# Patient Record
Sex: MALE | Race: WHITE | Employment: UNEMPLOYED | URBAN - METROPOLITAN AREA
[De-identification: names, ages, dates, MRNs, and addresses within clinical notes are randomized per-mention and may not be internally consistent; named-entity substitution may affect disease eponyms.]

---

## 2017-08-28 ENCOUNTER — HOSPITAL ENCOUNTER (EMERGENCY)
Age: 37
Discharge: HOME | End: 2017-08-29
Attending: EMERGENCY MEDICINE
Payer: COMMERCIAL

## 2017-08-28 DIAGNOSIS — S00.33XA NASAL CONTUSION: Primary | ICD-10-CM

## 2017-08-28 PROCEDURE — 99284 EMERGENCY DEPT VISIT MOD MDM: CPT

## 2017-08-28 RX ORDER — TRAZODONE HYDROCHLORIDE 100 MG/1
100 TABLET ORAL NIGHTLY
Status: ON HOLD | COMMUNITY
End: 2021-08-26

## 2017-08-28 RX ORDER — GABAPENTIN 100 MG/1
100 CAPSULE ORAL 3 TIMES DAILY
Status: ON HOLD | COMMUNITY
End: 2021-08-26

## 2017-08-28 RX ORDER — BUPROPION HYDROCHLORIDE 100 MG/1
100 TABLET ORAL 2 TIMES DAILY
Status: ON HOLD | COMMUNITY
End: 2021-08-26

## 2017-08-28 RX ORDER — DIVALPROEX SODIUM 125 MG/1
125 CAPSULE, COATED PELLETS ORAL 2 TIMES DAILY
Status: ON HOLD | COMMUNITY
End: 2021-08-26

## 2017-08-28 ASSESSMENT — PAIN DESCRIPTION - LOCATION: LOCATION: NOSE

## 2017-08-28 ASSESSMENT — PAIN DESCRIPTION - DESCRIPTORS: DESCRIPTORS: ACHING

## 2017-08-28 ASSESSMENT — PAIN DESCRIPTION - PAIN TYPE: TYPE: ACUTE PAIN

## 2017-08-28 ASSESSMENT — PAIN DESCRIPTION - FREQUENCY: FREQUENCY: CONTINUOUS

## 2017-08-28 ASSESSMENT — PAIN SCALES - GENERAL: PAINLEVEL_OUTOF10: 7

## 2017-08-29 ENCOUNTER — APPOINTMENT (OUTPATIENT)
Dept: CT IMAGING | Age: 37
End: 2017-08-29
Payer: COMMERCIAL

## 2017-08-29 VITALS
SYSTOLIC BLOOD PRESSURE: 128 MMHG | TEMPERATURE: 99.6 F | HEIGHT: 68 IN | DIASTOLIC BLOOD PRESSURE: 74 MMHG | HEART RATE: 75 BPM | OXYGEN SATURATION: 98 % | WEIGHT: 220 LBS | BODY MASS INDEX: 33.34 KG/M2 | RESPIRATION RATE: 14 BRPM

## 2017-08-29 PROCEDURE — 6370000000 HC RX 637 (ALT 250 FOR IP): Performed by: EMERGENCY MEDICINE

## 2017-08-29 PROCEDURE — 70486 CT MAXILLOFACIAL W/O DYE: CPT

## 2017-08-29 RX ORDER — TRAMADOL HYDROCHLORIDE 50 MG/1
50 TABLET ORAL ONCE
Status: COMPLETED | OUTPATIENT
Start: 2017-08-29 | End: 2017-08-29

## 2017-08-29 RX ORDER — IBUPROFEN 600 MG/1
600 TABLET ORAL EVERY 6 HOURS PRN
Qty: 30 TABLET | Refills: 0 | Status: ON HOLD | OUTPATIENT
Start: 2017-08-29 | End: 2021-08-28 | Stop reason: HOSPADM

## 2017-08-29 RX ADMIN — TRAMADOL HYDROCHLORIDE 50 MG: 50 TABLET, FILM COATED ORAL at 00:18

## 2017-08-29 ASSESSMENT — PAIN DESCRIPTION - PROGRESSION: CLINICAL_PROGRESSION: GRADUALLY IMPROVING

## 2017-08-29 ASSESSMENT — PAIN DESCRIPTION - PAIN TYPE: TYPE: ACUTE PAIN

## 2017-08-29 ASSESSMENT — ENCOUNTER SYMPTOMS
SORE THROAT: 0
CHEST TIGHTNESS: 0
SHORTNESS OF BREATH: 0
EYE DISCHARGE: 0
PHOTOPHOBIA: 0
ABDOMINAL PAIN: 0
ABDOMINAL DISTENTION: 0
VOMITING: 0
COUGH: 0
WHEEZING: 0

## 2017-08-29 ASSESSMENT — PAIN SCALES - GENERAL
PAINLEVEL_OUTOF10: 7
PAINLEVEL_OUTOF10: 4

## 2017-08-29 ASSESSMENT — PAIN DESCRIPTION - DESCRIPTORS: DESCRIPTORS: ACHING

## 2017-08-29 ASSESSMENT — PAIN DESCRIPTION - LOCATION: LOCATION: HEAD

## 2017-08-29 ASSESSMENT — PAIN DESCRIPTION - FREQUENCY: FREQUENCY: CONTINUOUS

## 2021-08-26 ENCOUNTER — APPOINTMENT (OUTPATIENT)
Dept: GENERAL RADIOLOGY | Age: 41
DRG: 381 | End: 2021-08-26
Payer: COMMERCIAL

## 2021-08-26 ENCOUNTER — HOSPITAL ENCOUNTER (INPATIENT)
Age: 41
LOS: 2 days | Discharge: HOME | DRG: 381 | End: 2021-08-28
Attending: STUDENT IN AN ORGANIZED HEALTH CARE EDUCATION/TRAINING PROGRAM | Admitting: FAMILY MEDICINE
Payer: COMMERCIAL

## 2021-08-26 ENCOUNTER — ANESTHESIA (OUTPATIENT)
Dept: OPERATING ROOM | Age: 41
DRG: 381 | End: 2021-08-26
Payer: COMMERCIAL

## 2021-08-26 ENCOUNTER — ANESTHESIA EVENT (OUTPATIENT)
Dept: OPERATING ROOM | Age: 41
DRG: 381 | End: 2021-08-26
Payer: COMMERCIAL

## 2021-08-26 VITALS — OXYGEN SATURATION: 100 % | SYSTOLIC BLOOD PRESSURE: 108 MMHG | DIASTOLIC BLOOD PRESSURE: 49 MMHG

## 2021-08-26 DIAGNOSIS — K92.2 ACUTE UPPER GASTROINTESTINAL BLEEDING: Primary | ICD-10-CM

## 2021-08-26 DIAGNOSIS — D64.9 SYMPTOMATIC ANEMIA: ICD-10-CM

## 2021-08-26 LAB
ABO/RH: NORMAL
ALBUMIN SERPL-MCNC: 2.8 G/DL (ref 3.5–4.6)
ALP BLD-CCNC: 52 U/L (ref 35–104)
ALT SERPL-CCNC: 364 U/L (ref 0–41)
AMPHETAMINE SCREEN, URINE: POSITIVE
ANION GAP SERPL CALCULATED.3IONS-SCNC: 12 MEQ/L (ref 9–15)
ANTIBODY SCREEN: NORMAL
APTT: 25.2 SEC (ref 24.4–36.8)
AST SERPL-CCNC: 240 U/L (ref 0–40)
BARBITURATE SCREEN URINE: ABNORMAL
BASOPHILS ABSOLUTE: 0 K/UL (ref 0–0.2)
BASOPHILS RELATIVE PERCENT: 0.1 %
BENZODIAZEPINE SCREEN, URINE: ABNORMAL
BILIRUB SERPL-MCNC: 0.3 MG/DL (ref 0.2–0.7)
BILIRUBIN URINE: NEGATIVE
BLOOD BANK DISPENSE STATUS: NORMAL
BLOOD BANK DISPENSE STATUS: NORMAL
BLOOD BANK PRODUCT CODE: NORMAL
BLOOD BANK PRODUCT CODE: NORMAL
BLOOD, URINE: NEGATIVE
BPU ID: NORMAL
BPU ID: NORMAL
BUN BLDV-MCNC: 72 MG/DL (ref 6–20)
CALCIUM SERPL-MCNC: 7 MG/DL (ref 8.5–9.9)
CANNABINOID SCREEN URINE: ABNORMAL
CHLORIDE BLD-SCNC: 89 MEQ/L (ref 95–107)
CK MB: 14.3 NG/ML (ref 0–6.7)
CLARITY: CLEAR
CO2: 23 MEQ/L (ref 20–31)
COCAINE METABOLITE SCREEN URINE: ABNORMAL
COLOR: YELLOW
CREAT SERPL-MCNC: 1.33 MG/DL (ref 0.7–1.2)
CREATINE KINASE-MB INDEX: 3.1 % (ref 0–3.5)
DESCRIPTION BLOOD BANK: NORMAL
DESCRIPTION BLOOD BANK: NORMAL
EOSINOPHILS ABSOLUTE: 0 K/UL (ref 0–0.7)
EOSINOPHILS RELATIVE PERCENT: 0 %
ETHANOL PERCENT: NORMAL G/DL
ETHANOL: <10 MG/DL (ref 0–0.08)
GFR AFRICAN AMERICAN: >60
GFR NON-AFRICAN AMERICAN: 59.9
GLOBULIN: 1.8 G/DL (ref 2.3–3.5)
GLUCOSE BLD-MCNC: 117 MG/DL (ref 70–99)
GLUCOSE BLD-MCNC: 133 MG/DL (ref 60–115)
GLUCOSE URINE: 100 MG/DL
HCT VFR BLD CALC: 13.9 % (ref 42–52)
HCT VFR BLD CALC: 19.1 % (ref 42–52)
HEMOGLOBIN: 4.9 G/DL (ref 14–18)
HEMOGLOBIN: 6.6 G/DL (ref 14–18)
INR BLD: 1.2
KETONES, URINE: NEGATIVE MG/DL
LACTIC ACID: 2.2 MMOL/L (ref 0.5–2.2)
LEUKOCYTE ESTERASE, URINE: NEGATIVE
LYMPHOCYTES ABSOLUTE: 0.6 K/UL (ref 1–4.8)
LYMPHOCYTES RELATIVE PERCENT: 4.5 %
Lab: ABNORMAL
MAGNESIUM: 2.2 MG/DL (ref 1.7–2.4)
MCH RBC QN AUTO: 30.6 PG (ref 27–31.3)
MCHC RBC AUTO-ENTMCNC: 35 % (ref 33–37)
MCV RBC AUTO: 87.3 FL (ref 80–100)
METHADONE SCREEN, URINE: ABNORMAL
MONOCYTES ABSOLUTE: 0.9 K/UL (ref 0.2–0.8)
MONOCYTES RELATIVE PERCENT: 7.1 %
NEUTROPHILS ABSOLUTE: 11.4 K/UL (ref 1.4–6.5)
NEUTROPHILS RELATIVE PERCENT: 88.3 %
NITRITE, URINE: NEGATIVE
OPIATE SCREEN URINE: ABNORMAL
OXYCODONE URINE: ABNORMAL
PDW BLD-RTO: 13.4 % (ref 11.5–14.5)
PERFORMED ON: ABNORMAL
PH UA: 6 (ref 5–9)
PHENCYCLIDINE SCREEN URINE: ABNORMAL
PLATELET # BLD: 293 K/UL (ref 130–400)
POTASSIUM SERPL-SCNC: 4.8 MEQ/L (ref 3.4–4.9)
PROCALCITONIN: 11.93 NG/ML (ref 0–0.15)
PROPOXYPHENE SCREEN: ABNORMAL
PROTEIN UA: ABNORMAL MG/DL
PROTHROMBIN TIME: 15.3 SEC (ref 12.3–14.9)
RBC # BLD: 1.59 M/UL (ref 4.7–6.1)
SARS-COV-2, NAAT: NOT DETECTED
SODIUM BLD-SCNC: 124 MEQ/L (ref 135–144)
SPECIFIC GRAVITY UA: 1.01 (ref 1–1.03)
TOTAL CK: 462 U/L (ref 0–190)
TOTAL PROTEIN: 4.6 G/DL (ref 6.3–8)
URINE REFLEX TO CULTURE: ABNORMAL
UROBILINOGEN, URINE: 0.2 E.U./DL
WBC # BLD: 12.9 K/UL (ref 4.8–10.8)

## 2021-08-26 PROCEDURE — 7100000000 HC PACU RECOVERY - FIRST 15 MIN: Performed by: SPECIALIST

## 2021-08-26 PROCEDURE — 83735 ASSAY OF MAGNESIUM: CPT

## 2021-08-26 PROCEDURE — 82550 ASSAY OF CK (CPK): CPT

## 2021-08-26 PROCEDURE — 86923 COMPATIBILITY TEST ELECTRIC: CPT

## 2021-08-26 PROCEDURE — 82077 ASSAY SPEC XCP UR&BREATH IA: CPT

## 2021-08-26 PROCEDURE — 82553 CREATINE MB FRACTION: CPT

## 2021-08-26 PROCEDURE — 43255 EGD CONTROL BLEEDING ANY: CPT | Performed by: SPECIALIST

## 2021-08-26 PROCEDURE — 85025 COMPLETE CBC W/AUTO DIFF WBC: CPT

## 2021-08-26 PROCEDURE — 85730 THROMBOPLASTIN TIME PARTIAL: CPT

## 2021-08-26 PROCEDURE — 83036 HEMOGLOBIN GLYCOSYLATED A1C: CPT

## 2021-08-26 PROCEDURE — 86901 BLOOD TYPING SEROLOGIC RH(D): CPT

## 2021-08-26 PROCEDURE — 81003 URINALYSIS AUTO W/O SCOPE: CPT

## 2021-08-26 PROCEDURE — 99285 EMERGENCY DEPT VISIT HI MDM: CPT

## 2021-08-26 PROCEDURE — 36430 TRANSFUSION BLD/BLD COMPNT: CPT

## 2021-08-26 PROCEDURE — 84145 PROCALCITONIN (PCT): CPT

## 2021-08-26 PROCEDURE — 2000000000 HC ICU R&B

## 2021-08-26 PROCEDURE — 2720000010 HC SURG SUPPLY STERILE: Performed by: SPECIALIST

## 2021-08-26 PROCEDURE — 85610 PROTHROMBIN TIME: CPT

## 2021-08-26 PROCEDURE — 6360000002 HC RX W HCPCS: Performed by: FAMILY MEDICINE

## 2021-08-26 PROCEDURE — 7100000001 HC PACU RECOVERY - ADDTL 15 MIN: Performed by: SPECIALIST

## 2021-08-26 PROCEDURE — 36415 COLL VENOUS BLD VENIPUNCTURE: CPT

## 2021-08-26 PROCEDURE — 2580000003 HC RX 258: Performed by: FAMILY MEDICINE

## 2021-08-26 PROCEDURE — 96365 THER/PROPH/DIAG IV INF INIT: CPT

## 2021-08-26 PROCEDURE — 87635 SARS-COV-2 COVID-19 AMP PRB: CPT

## 2021-08-26 PROCEDURE — 3609017100 HC EGD: Performed by: SPECIALIST

## 2021-08-26 PROCEDURE — 2500000003 HC RX 250 WO HCPCS: Performed by: STUDENT IN AN ORGANIZED HEALTH CARE EDUCATION/TRAINING PROGRAM

## 2021-08-26 PROCEDURE — 6360000002 HC RX W HCPCS: Performed by: ANESTHESIOLOGY

## 2021-08-26 PROCEDURE — 86900 BLOOD TYPING SEROLOGIC ABO: CPT

## 2021-08-26 PROCEDURE — 83605 ASSAY OF LACTIC ACID: CPT

## 2021-08-26 PROCEDURE — 2709999900 HC NON-CHARGEABLE SUPPLY: Performed by: SPECIALIST

## 2021-08-26 PROCEDURE — 85014 HEMATOCRIT: CPT

## 2021-08-26 PROCEDURE — 96368 THER/DIAG CONCURRENT INF: CPT

## 2021-08-26 PROCEDURE — 80053 COMPREHEN METABOLIC PANEL: CPT

## 2021-08-26 PROCEDURE — 80307 DRUG TEST PRSMV CHEM ANLYZR: CPT

## 2021-08-26 PROCEDURE — 85018 HEMOGLOBIN: CPT

## 2021-08-26 PROCEDURE — 3700000001 HC ADD 15 MINUTES (ANESTHESIA): Performed by: SPECIALIST

## 2021-08-26 PROCEDURE — 2580000003 HC RX 258: Performed by: STUDENT IN AN ORGANIZED HEALTH CARE EDUCATION/TRAINING PROGRAM

## 2021-08-26 PROCEDURE — 6360000002 HC RX W HCPCS: Performed by: SPECIALIST

## 2021-08-26 PROCEDURE — 2580000003 HC RX 258: Performed by: SPECIALIST

## 2021-08-26 PROCEDURE — 6360000002 HC RX W HCPCS: Performed by: STUDENT IN AN ORGANIZED HEALTH CARE EDUCATION/TRAINING PROGRAM

## 2021-08-26 PROCEDURE — 3700000000 HC ANESTHESIA ATTENDED CARE: Performed by: SPECIALIST

## 2021-08-26 PROCEDURE — 0W3P8ZZ CONTROL BLEEDING IN GASTROINTESTINAL TRACT, VIA NATURAL OR ARTIFICIAL OPENING ENDOSCOPIC: ICD-10-PCS | Performed by: SPECIALIST

## 2021-08-26 PROCEDURE — C9113 INJ PANTOPRAZOLE SODIUM, VIA: HCPCS | Performed by: STUDENT IN AN ORGANIZED HEALTH CARE EDUCATION/TRAINING PROGRAM

## 2021-08-26 PROCEDURE — P9016 RBC LEUKOCYTES REDUCED: HCPCS

## 2021-08-26 PROCEDURE — 71045 X-RAY EXAM CHEST 1 VIEW: CPT

## 2021-08-26 PROCEDURE — 86850 RBC ANTIBODY SCREEN: CPT

## 2021-08-26 PROCEDURE — C9113 INJ PANTOPRAZOLE SODIUM, VIA: HCPCS | Performed by: FAMILY MEDICINE

## 2021-08-26 RX ORDER — ACETAMINOPHEN 325 MG/1
650 TABLET ORAL EVERY 6 HOURS PRN
Status: DISCONTINUED | OUTPATIENT
Start: 2021-08-26 | End: 2021-08-28 | Stop reason: HOSPADM

## 2021-08-26 RX ORDER — HYDROCODONE BITARTRATE AND ACETAMINOPHEN 5; 325 MG/1; MG/1
2 TABLET ORAL PRN
Status: DISCONTINUED | OUTPATIENT
Start: 2021-08-26 | End: 2021-08-26 | Stop reason: HOSPADM

## 2021-08-26 RX ORDER — METOCLOPRAMIDE HYDROCHLORIDE 5 MG/ML
10 INJECTION INTRAMUSCULAR; INTRAVENOUS
Status: DISCONTINUED | OUTPATIENT
Start: 2021-08-26 | End: 2021-08-26 | Stop reason: HOSPADM

## 2021-08-26 RX ORDER — DIPHENHYDRAMINE HYDROCHLORIDE 50 MG/ML
12.5 INJECTION INTRAMUSCULAR; INTRAVENOUS
Status: DISCONTINUED | OUTPATIENT
Start: 2021-08-26 | End: 2021-08-26 | Stop reason: HOSPADM

## 2021-08-26 RX ORDER — PANTOPRAZOLE SODIUM 40 MG/1
40 TABLET, DELAYED RELEASE ORAL
Status: DISCONTINUED | OUTPATIENT
Start: 2021-08-29 | End: 2021-08-28 | Stop reason: HOSPADM

## 2021-08-26 RX ORDER — SODIUM CHLORIDE 9 MG/ML
25 INJECTION, SOLUTION INTRAVENOUS PRN
Status: DISCONTINUED | OUTPATIENT
Start: 2021-08-26 | End: 2021-08-28 | Stop reason: HOSPADM

## 2021-08-26 RX ORDER — SODIUM CHLORIDE 9 MG/ML
INJECTION, SOLUTION INTRAVENOUS CONTINUOUS
Status: DISCONTINUED | OUTPATIENT
Start: 2021-08-26 | End: 2021-08-27

## 2021-08-26 RX ORDER — SODIUM CHLORIDE 0.9 % (FLUSH) 0.9 %
5-40 SYRINGE (ML) INJECTION EVERY 12 HOURS SCHEDULED
Status: DISCONTINUED | OUTPATIENT
Start: 2021-08-26 | End: 2021-08-28 | Stop reason: HOSPADM

## 2021-08-26 RX ORDER — ACETAMINOPHEN 650 MG/1
650 SUPPOSITORY RECTAL EVERY 6 HOURS PRN
Status: DISCONTINUED | OUTPATIENT
Start: 2021-08-26 | End: 2021-08-28 | Stop reason: HOSPADM

## 2021-08-26 RX ORDER — MAGNESIUM HYDROXIDE 1200 MG/15ML
LIQUID ORAL PRN
Status: DISCONTINUED | OUTPATIENT
Start: 2021-08-26 | End: 2021-08-26 | Stop reason: ALTCHOICE

## 2021-08-26 RX ORDER — ONDANSETRON 2 MG/ML
4 INJECTION INTRAMUSCULAR; INTRAVENOUS
Status: DISCONTINUED | OUTPATIENT
Start: 2021-08-26 | End: 2021-08-26 | Stop reason: HOSPADM

## 2021-08-26 RX ORDER — HYDROCODONE BITARTRATE AND ACETAMINOPHEN 5; 325 MG/1; MG/1
1 TABLET ORAL PRN
Status: DISCONTINUED | OUTPATIENT
Start: 2021-08-26 | End: 2021-08-26 | Stop reason: HOSPADM

## 2021-08-26 RX ORDER — ONDANSETRON 4 MG/1
4 TABLET, ORALLY DISINTEGRATING ORAL EVERY 8 HOURS PRN
Status: DISCONTINUED | OUTPATIENT
Start: 2021-08-26 | End: 2021-08-28 | Stop reason: HOSPADM

## 2021-08-26 RX ORDER — ONDANSETRON 2 MG/ML
4 INJECTION INTRAMUSCULAR; INTRAVENOUS EVERY 6 HOURS PRN
Status: DISCONTINUED | OUTPATIENT
Start: 2021-08-26 | End: 2021-08-28 | Stop reason: HOSPADM

## 2021-08-26 RX ORDER — FENTANYL CITRATE 50 UG/ML
50 INJECTION, SOLUTION INTRAMUSCULAR; INTRAVENOUS EVERY 10 MIN PRN
Status: DISCONTINUED | OUTPATIENT
Start: 2021-08-26 | End: 2021-08-26 | Stop reason: HOSPADM

## 2021-08-26 RX ORDER — 0.9 % SODIUM CHLORIDE 0.9 %
1000 INTRAVENOUS SOLUTION INTRAVENOUS ONCE
Status: COMPLETED | OUTPATIENT
Start: 2021-08-26 | End: 2021-08-26

## 2021-08-26 RX ORDER — SODIUM CHLORIDE 0.9 % (FLUSH) 0.9 %
5-40 SYRINGE (ML) INJECTION PRN
Status: DISCONTINUED | OUTPATIENT
Start: 2021-08-26 | End: 2021-08-28 | Stop reason: HOSPADM

## 2021-08-26 RX ORDER — MAGNESIUM HYDROXIDE 1200 MG/15ML
LIQUID ORAL CONTINUOUS PRN
Status: COMPLETED | OUTPATIENT
Start: 2021-08-26 | End: 2021-08-26

## 2021-08-26 RX ORDER — SODIUM CHLORIDE 9 MG/ML
INJECTION, SOLUTION INTRAVENOUS PRN
Status: DISCONTINUED | OUTPATIENT
Start: 2021-08-26 | End: 2021-08-28 | Stop reason: HOSPADM

## 2021-08-26 RX ORDER — MEPERIDINE HYDROCHLORIDE 25 MG/ML
12.5 INJECTION INTRAMUSCULAR; INTRAVENOUS; SUBCUTANEOUS EVERY 5 MIN PRN
Status: DISCONTINUED | OUTPATIENT
Start: 2021-08-26 | End: 2021-08-26 | Stop reason: HOSPADM

## 2021-08-26 RX ORDER — PROPOFOL 10 MG/ML
INJECTION, EMULSION INTRAVENOUS PRN
Status: DISCONTINUED | OUTPATIENT
Start: 2021-08-26 | End: 2021-08-26 | Stop reason: SDUPTHER

## 2021-08-26 RX ORDER — ONDANSETRON 2 MG/ML
4 INJECTION INTRAMUSCULAR; INTRAVENOUS
Status: ACTIVE | OUTPATIENT
Start: 2021-08-26 | End: 2021-08-26

## 2021-08-26 RX ADMIN — PROPOFOL 30 MG: 10 INJECTION, EMULSION INTRAVENOUS at 16:41

## 2021-08-26 RX ADMIN — PHENYLEPHRINE HYDROCHLORIDE 100 MCG: 10 INJECTION INTRAVENOUS at 16:21

## 2021-08-26 RX ADMIN — PROPOFOL 70 MG: 10 INJECTION, EMULSION INTRAVENOUS at 16:30

## 2021-08-26 RX ADMIN — PROPOFOL 30 MG: 10 INJECTION, EMULSION INTRAVENOUS at 16:20

## 2021-08-26 RX ADMIN — SODIUM CHLORIDE: 9 INJECTION, SOLUTION INTRAVENOUS at 19:34

## 2021-08-26 RX ADMIN — SODIUM CHLORIDE 1000 ML: 9 INJECTION, SOLUTION INTRAVENOUS at 15:05

## 2021-08-26 RX ADMIN — SODIUM CHLORIDE 8 MG/HR: 9 INJECTION, SOLUTION INTRAVENOUS at 19:40

## 2021-08-26 RX ADMIN — SODIUM CHLORIDE 80 MG: 9 INJECTION, SOLUTION INTRAVENOUS at 15:03

## 2021-08-26 RX ADMIN — SODIUM CHLORIDE, PRESERVATIVE FREE 10 ML: 5 INJECTION INTRAVENOUS at 19:42

## 2021-08-26 RX ADMIN — PHENYLEPHRINE HYDROCHLORIDE 200 MCG: 10 INJECTION INTRAVENOUS at 16:32

## 2021-08-26 RX ADMIN — PROPOFOL 30 MG: 10 INJECTION, EMULSION INTRAVENOUS at 16:35

## 2021-08-26 RX ADMIN — FOLIC ACID: 5 INJECTION, SOLUTION INTRAMUSCULAR; INTRAVENOUS; SUBCUTANEOUS at 15:03

## 2021-08-26 ASSESSMENT — PULMONARY FUNCTION TESTS
PIF_VALUE: 1

## 2021-08-26 ASSESSMENT — PAIN SCALES - GENERAL
PAINLEVEL_OUTOF10: 6
PAINLEVEL_OUTOF10: 0
PAINLEVEL_OUTOF10: 0
PAINLEVEL_OUTOF10: 3

## 2021-08-26 ASSESSMENT — PAIN DESCRIPTION - PAIN TYPE: TYPE: ACUTE PAIN

## 2021-08-26 ASSESSMENT — ENCOUNTER SYMPTOMS
CHEST TIGHTNESS: 0
VOMITING: 0
SINUS PRESSURE: 0
BACK PAIN: 0
SHORTNESS OF BREATH: 0
COUGH: 0
DIARRHEA: 0
TROUBLE SWALLOWING: 0
ABDOMINAL PAIN: 1

## 2021-08-26 ASSESSMENT — PAIN DESCRIPTION - FREQUENCY: FREQUENCY: CONTINUOUS

## 2021-08-26 ASSESSMENT — PAIN DESCRIPTION - DESCRIPTORS: DESCRIPTORS: ACHING

## 2021-08-26 ASSESSMENT — PAIN DESCRIPTION - LOCATION: LOCATION: ABDOMEN

## 2021-08-26 NOTE — ED TRIAGE NOTES
Pt arrived via ems with co abd pain vomiting blood. Pt states he has been sick since yesterday. Pt is aox4 pwd.

## 2021-08-26 NOTE — ANESTHESIA PRE PROCEDURE
Department of Anesthesiology  Preprocedure Note       Name:  Misael Stevens   Age:  47 y.o.  :  1980                                          MRN:  [id number]         Date:  2021      Surgeon: Maile Botello):  Eric Bowles MD    Procedure: Procedure(s):  ESOPHAGOGASTRODUODENOSCOPY. PT. CURRENTLY IN ER    Medications prior to admission:   Prior to Admission medications    Medication Sig Start Date End Date Taking?  Authorizing Provider   ibuprofen (ADVIL;MOTRIN) 600 MG tablet Take 1 tablet by mouth every 6 hours as needed for Pain 17   Arvind Cortez MD   buPROPion (WELLBUTRIN) 100 MG tablet Take 100 mg by mouth 2 times daily    Historical Provider, MD   traZODone (DESYREL) 100 MG tablet Take 100 mg by mouth nightly    Historical Provider, MD   divalproex (DEPAKOTE SPRINKLE) 125 MG capsule Take 125 mg by mouth 2 times daily    Historical Provider, MD   gabapentin (NEURONTIN) 100 MG capsule Take 100 mg by mouth 3 times daily    Historical Provider, MD       Current medications:    Current Facility-Administered Medications   Medication Dose Route Frequency Provider Last Rate Last Admin    adult multi-vitamin with vitamin k 10 mL, thiamine 955 mg, folic acid 1 mg, magnesium sulfate 1,000 mg in sodium chloride 0.9 % 1,000 mL infusion   IntraVENous Continuous Michael A Romito, DO 1,000 mL/hr at 21 1503 New Bag at 21 1503    ondansetron (ZOFRAN) injection 4 mg  4 mg IntraVENous Once PRN Leeta Mini A Romito, DO        0.9 % sodium chloride infusion   IntraVENous PRN Leeta Mini A Romito, DO        meperidine (DEMEROL) injection 12.5 mg  12.5 mg IntraVENous Q5 Min PRN Adele Atkinson MD        fentaNYL (SUBLIMAZE) injection 50 mcg  50 mcg IntraVENous Q10 Min PRN Adele Atkinson MD        HYDROmorphone (DILAUDID) injection 0.5 mg  0.5 mg IntraVENous Q10 Min PRN Adele Atkinson MD        HYDROcodone-acetaminophen [facility]) 5-325 MG per tablet 1 tablet  1 tablet Oral PRN Alma Cole Tanya Allan MD        Or    HYDROcodone-acetaminophen [facility]) 5-325 MG per tablet 2 tablet  2 tablet Oral PRN Kamron De Jesus MD        ondansetron [facility]) injection 4 mg  4 mg IntraVENous Once PRN Kamron De Jesus MD        metoclopramide [facility]) injection 10 mg  10 mg IntraVENous Once PRN Kamron De Jesus MD        diphenhydrAMINE (BENADRYL) injection 12.5 mg  12.5 mg IntraVENous Once PRN Kamron De Jesus MD         Current Outpatient Medications   Medication Sig Dispense Refill    ibuprofen (ADVIL;MOTRIN) 600 MG tablet Take 1 tablet by mouth every 6 hours as needed for Pain 30 tablet 0    buPROPion (WELLBUTRIN) 100 MG tablet Take 100 mg by mouth 2 times daily      traZODone (DESYREL) 100 MG tablet Take 100 mg by mouth nightly      divalproex (DEPAKOTE SPRINKLE) 125 MG capsule Take 125 mg by mouth 2 times daily      gabapentin (NEURONTIN) 100 MG capsule Take 100 mg by mouth 3 times daily         Allergies: Allergies   Allergen Reactions    Effexor [Venlafaxine]     Remeron [Mirtazapine]     Seasonal        Problem List:  There is no problem list on file for this patient. Past Medical History:        Diagnosis Date    Bipolar 1 disorder [facility])        Past Surgical History:  History reviewed. No pertinent surgical history.     Social History:    Social History     Tobacco Use    Smoking status: Current Every Day Smoker     Packs/day: 1.00     Years: 10.00     Pack years: 10.00     Types: Cigarettes   Substance Use Topics    Alcohol use: Yes     Comment: occassional                                Ready to quit: Not Answered  Counseling given: Not Answered      Vital Signs (Current):   Vitals:    08/26/21 1345 08/26/21 1400 08/26/21 1415 08/26/21 1430   BP:  (!) 98/54  (!) 103/54   Pulse: 109 108 112 107   Resp: 14 12 14 11   Temp:       TempSrc:       SpO2: 96% 98% 100% 99%   Weight:       Height:                                                  BP Readings from Last 3 Encounters:   08/26/21 (!) 103/54   08/29/17 128/74       NPO Status:                                                                                 BMI:   Wt Readings from Last 3 Encounters:   08/26/21 170 lb (77.1 kg)   08/28/17 220 lb (99.8 kg)   09/15/16 210 lb 1.6 oz (95.3 kg)     Body mass index is 25.85 kg/m².     CBC:   Lab Results   Component Value Date    WBC 12.9 08/26/2021    RBC 1.59 08/26/2021    RBC 4.92 12/31/2011    HGB 4.9 08/26/2021    HCT 13.9 08/26/2021    MCV 87.3 08/26/2021    RDW 13.4 08/26/2021     08/26/2021       CMP:   Lab Results   Component Value Date     08/26/2021    K 4.8 08/26/2021    CL 89 08/26/2021    CO2 23 08/26/2021    BUN 72 08/26/2021    CREATININE 1.33 08/26/2021    GFRAA >60.0 08/26/2021    LABGLOM 59.9 08/26/2021    GLUCOSE 117 08/26/2021    GLUCOSE 97 12/31/2011    PROT 4.6 08/26/2021    CALCIUM 7.0 08/26/2021    BILITOT 0.3 08/26/2021    ALKPHOS 52 08/26/2021     08/26/2021     08/26/2021       POC Tests:   Recent Labs     08/26/21  1349   POCGLU 133*       Coags:   Lab Results   Component Value Date    PROTIME 15.3 08/26/2021    INR 1.2 08/26/2021    APTT 25.2 08/26/2021       HCG (If Applicable): No results found for: PREGTESTUR, PREGSERUM, HCG, HCGQUANT     ABGs:   Lab Results   Component Value Date    PHART 7.426 06/07/2014    PO2ART 130 06/07/2014    DES9IST 39 06/07/2014    JSS1CLZ 25.6 06/07/2014    BEART 1 06/07/2014    O7ZSOFTQ 99 06/07/2014        Type & Screen (If Applicable):  No results found for: LABABO, LABRH    Drug/Infectious Status (If Applicable):  No results found for: HIV, HEPCAB    COVID-19 Screening (If Applicable):   Lab Results   Component Value Date    COVID19 Not Detected 08/26/2021           Anesthesia Evaluation  Patient summary reviewed and Nursing notes reviewed no history of anesthetic complications:   Airway: Mallampati: II  TM distance: >3 FB   Neck ROM: full  Mouth opening: > = 3 FB Dental: normal exam Pulmonary:Negative Pulmonary ROS and normal exam                               Cardiovascular:Negative CV ROS                      Neuro/Psych:               GI/Hepatic/Renal: Neg GI/Hepatic/Renal ROS            Endo/Other: Negative Endo/Other ROS                    Abdominal:             Vascular: negative vascular ROS. Other Findings:             Anesthesia Plan      MAC     ASA 2 - emergent       Induction: intravenous. Anesthetic plan and risks discussed with patient.                       Santino Guy MD   8/26/2021

## 2021-08-26 NOTE — ED PROVIDER NOTES
[facility] OR  eMERGENCY dEPARTMENT eNCOUnter      Pt Name: Neema Mccoy  MRN: [id number]  [place] 1980  Date of evaluation: 8/26/2021  Provider: Jania Sheets DO    CHIEF COMPLAINT       Chief Complaint   Patient presents with    Abdominal Pain         HISTORY OF PRESENT ILLNESS   (Location/Symptom, Timing/Onset,Context/Setting, Quality, Duration, Modifying Factors, Severity)  Note limiting factors. Neema Mccoy is a 47 y.o. male who presents to the emergency department with c/o gastric abdominal pain. Patient states he started sweating and having some chills. Patient states he then vomited up blood. Patient states he has not drinking any alcohol in 3 days and only drinks socially every couple of days but not every day. Patient denies any fever. Patient denies feeling like he is in withdrawal.    Patient denies any drug use. Patient states he believes he has a stomach ulcer notes why he vomited up bright red blood. Patient denies any black, bloody or tarry stools. The history is provided by the patient. NursingNotes were reviewed. REVIEW OF SYSTEMS    (2-9 systems for level 4, 10 or more for level 5)     Review of Systems   Constitutional: Positive for appetite change, chills and diaphoresis. Negative for activity change, fever and unexpected weight change. HENT: Negative for drooling, ear pain, nosebleeds, sinus pressure and trouble swallowing. Respiratory: Negative for cough, chest tightness and shortness of breath. Cardiovascular: Negative for chest pain and leg swelling. Gastrointestinal: Positive for abdominal pain. Negative for diarrhea and vomiting. Hematemesis   Endocrine: Negative for polydipsia and polyphagia. Genitourinary: Negative for dysuria, flank pain and frequency. Musculoskeletal: Negative for back pain and myalgias. Skin: Negative for pallor and rash. Neurological: Negative for syncope, weakness and headaches.    Hematological: Does not bruise/bleed easily. All other systems reviewed and are negative. Except as noted above the remainder of the review of systems was reviewed and negative. PAST MEDICAL HISTORY     Past Medical History:   Diagnosis Date    Bipolar 1 disorder ([facility] [address]          SURGICALHISTORY     History reviewed. No pertinent surgical history. CURRENT MEDICATIONS       Current Discharge Medication List      CONTINUE these medications which have NOT CHANGED    Details   ibuprofen (ADVIL;MOTRIN) 600 MG tablet Take 1 tablet by mouth every 6 hours as needed for Pain  Qty: 30 tablet, Refills: 0      buPROPion (WELLBUTRIN) 100 MG tablet Take 100 mg by mouth 2 times daily      traZODone (DESYREL) 100 MG tablet Take 100 mg by mouth nightly      divalproex (DEPAKOTE SPRINKLE) 125 MG capsule Take 125 mg by mouth 2 times daily      gabapentin (NEURONTIN) 100 MG capsule Take 100 mg by mouth 3 times daily             ALLERGIES     Effexor [venlafaxine], Remeron [mirtazapine], and Seasonal    FAMILY HISTORY     History reviewed. No pertinent family history. SOCIAL HISTORY       Social History     Socioeconomic History    Marital status: Single     Spouse name: None    Number of children: None    Years of education: None    Highest education level: None   Occupational History    None   Tobacco Use    Smoking status: Current Every Day Smoker     Packs/day: 1.00     Years: 10.00     Pack years: 10.00     Types: Cigarettes   Substance and Sexual Activity    Alcohol use: Yes     Comment: occassional    Drug use: Yes     Types: Marijuana    Sexual activity: None   Other Topics Concern    None   Social History Narrative    None     Social Determinants of Health     Financial Resource Strain:     Difficulty of Paying Living Expenses:    Food Insecurity:     Worried About Running Out of Food in the Last Year:     Ran Out of Food in the Last Year:    Transportation Needs:     Lack of Transportation (Medical):      Lack of Transportation (Non-Medical):    Physical Activity:     Days of Exercise per Week:     Minutes of Exercise per Session:    Stress:     Feeling of Stress :    Social Connections:     Frequency of Communication with Friends and Family:     Frequency of Social Gatherings with Friends and Family:     Attends Mosque Services:     Active Member of Clubs or Organizations:     Attends Club or Organization Meetings:     Marital Status:    Intimate Partner Violence:     Fear of Current or Ex-Partner:     Emotionally Abused:     Physically Abused:     Sexually Abused:        SCREENINGS    Heladio Coma Scale  Eye Opening: Spontaneous  Best Verbal Response: Oriented  Best Motor Response: Obeys commands  [place] Coma Scale Score: 15 @FLOW([id number])@      PHYSICAL EXAM    (up to 7 for level 4, 8 or more for level 5)     ED Triage Vitals [08/26/21 1334]   BP Temp Temp Source Pulse Resp SpO2 Height Weight   (!) 117/55 98.4 °F (36.9 °C) Oral 115 18 99 % 5' 8\" (1.727 m) 170 lb (77.1 kg)       Physical Exam  Vitals and nursing note reviewed. Constitutional:       General: He is awake. He is not in acute distress. Appearance: Normal appearance. He is well-developed and normal weight. He is not ill-appearing, toxic-appearing or diaphoretic. Comments: No photophobia. No phonophobia. HENT:      Head: Normocephalic and atraumatic. No Lopez's sign. Right Ear: External ear normal.      Left Ear: External ear normal.      Nose: Nose normal. No congestion or rhinorrhea. Mouth/Throat:      Mouth: Mucous membranes are moist.      Pharynx: Oropharynx is clear. No oropharyngeal exudate or posterior oropharyngeal erythema. Eyes:      General: No scleral icterus. Right eye: No foreign body or discharge. Left eye: No discharge. Extraocular Movements: Extraocular movements intact. Conjunctiva/sclera: Conjunctivae normal.      Left eye: No exudate.      Pupils: Pupils are equal, round, and reactive to light. Neck:      Vascular: No JVD. Trachea: No tracheal deviation. Comments: No meningismus. Cardiovascular:      Rate and Rhythm: Normal rate and regular rhythm. Heart sounds: Normal heart sounds. Heart sounds not distant. No murmur heard. No friction rub. No gallop. Pulmonary:      Effort: Pulmonary effort is normal. No respiratory distress. Breath sounds: Normal breath sounds. No stridor. No wheezing, rhonchi or rales. Chest:      Chest wall: No tenderness. Abdominal:      General: Abdomen is flat. Bowel sounds are normal. There is no distension or abdominal bruit. Palpations: Abdomen is soft. There is no shifting dullness, fluid wave, hepatomegaly, splenomegaly or mass. Tenderness: There is abdominal tenderness in the epigastric area. There is no right CVA tenderness, left CVA tenderness, guarding or rebound. Negative signs include Oliveira's sign and McBurney's sign. Hernia: No hernia is present. Musculoskeletal:         General: No swelling, tenderness, deformity or signs of injury. Normal range of motion. Cervical back: Normal range of motion and neck supple. No rigidity. Lymphadenopathy:      Head:      Right side of head: No submental adenopathy. Left side of head: No submental adenopathy. Skin:     General: Skin is warm and dry. Capillary Refill: Capillary refill takes less than 2 seconds. Coloration: Skin is not jaundiced or pale. Findings: No bruising, erythema, lesion or rash. Comments: Patient has multiple scars about his body. All well-healed. Neurological:      General: No focal deficit present. Mental Status: He is alert and oriented to person, place, and time. Mental status is at baseline. Cranial Nerves: No cranial nerve deficit. Sensory: No sensory deficit. Motor: No weakness.       Coordination: Coordination normal.      Deep Tendon Reflexes: Reflexes are normal and symmetric. Psychiatric:         Mood and Affect: Mood normal.         Behavior: Behavior normal. Behavior is cooperative. Thought Content: Thought content normal.         Judgment: Judgment normal.         DIAGNOSTIC RESULTS     EKG: All EKG's are interpreted by the Emergency Department Physician who either signs or Co-signsthis chart in the absence of a cardiologist.        RADIOLOGY:   Charma Bourdon such as CT, Ultrasound and MRI are read by the radiologist. Plain radiographic images are visualized and preliminarily interpreted by the emergency physician with the below findings:    Chest x-ray: No infiltrate, no pleural effusion, no free air. Interpretation per the Radiologist below, if available at the time ofthis note:    XR CHEST PORTABLE   Final Result   NO ACUTE CARDIOPULMONARY DISEASE.             ED BEDSIDE ULTRASOUND:   Performed by ED Physician - none    LABS:  Labs Reviewed   CBC WITH AUTO DIFFERENTIAL - Abnormal; Notable for the following components:       Result Value    WBC 12.9 (*)     RBC 1.59 (*)     Hemoglobin 4.9 (*)     Hematocrit 13.9 (*)     Neutrophils Absolute 11.4 (*)     Lymphocytes Absolute 0.6 (*)     Monocytes Absolute 0.9 (*)     All other components within normal limits    Narrative:     CALL  [facility]  [facility] tel. [phone],  HGB and HCT results called to and read back by Lore Forrester, 08/26/2021  15:17, by DAIJA   COMPREHENSIVE METABOLIC PANEL - Abnormal; Notable for the following components:    Sodium 124 (*)     Chloride 89 (*)     Glucose 117 (*)     BUN 72 (*)     CREATININE 1.33 (*)     GFR Non- 59.9 (*)     Calcium 7.0 (*)     Total Protein 4.6 (*)     Albumin 2.8 (*)      (*)      (*)     Globulin 1.8 (*)     All other components within normal limits   PROTIME-INR - Abnormal; Notable for the following components:    Protime 15.3 (*)     All other components within normal limits   CK - Abnormal; Notable for the following components: Total  (*)     All other components within normal limits   URINE RT REFLEX TO CULTURE - Abnormal; Notable for the following components:    Glucose, Ur 100 (*)     Protein, UA TRACE (*)     All other components within normal limits   URINE DRUG SCREEN - Abnormal; Notable for the following components:    Amphetamine Screen, Urine POSITIVE (*)     All other components within normal limits   PROCALCITONIN - Abnormal; Notable for the following components:    Procalcitonin 11.93 (*)     All other components within normal limits   CKMB & RELATIVE PERCENT - Abnormal; Notable for the following components:    CK-MB 14.3 (*)     All other components within normal limits   POCT GLUCOSE - Abnormal; Notable for the following components:    POC Glucose 133 (*)     All other components within normal limits   COVID-19, RAPID   APTT   ETHANOL   LACTIC ACID, PLASMA   MAGNESIUM   HEMOGLOBIN A1C   HEMOGLOBIN AND HEMATOCRIT, BLOOD   HEMOGLOBIN AND HEMATOCRIT, BLOOD   TYPE AND SCREEN   PREPARE RBC (CROSSMATCH)       All other labs were within normal range or not returned as of this dictation. EMERGENCY DEPARTMENT COURSE and DIFFERENTIAL DIAGNOSIS/MDM:   Vitals:    Vitals:    08/26/21 1655 08/26/21 1700 08/26/21 1705 08/26/21 1710   BP: (!) 116/53 (!) 106/58 (!) 110/58 (!) 112/55   Pulse: 101 98 97 94   Resp: 16 12 14 8   Temp: 98.5 °F (36.9 °C)      TempSrc: Temporal      SpO2: 97% 100% 100%    Weight:       Height:               MDM  Patient has symptomatic anemia hemoglobin is 4.9.  GI consult was obtained with Dr. Kyle Ferguson and patient will go to get an EGD. Patient was given bolus of Protonix as well as an IV banana bag. Patient had an initial blood sugar in the 300s however it came down with just IV fluids. The patient was admitted to the hospitalist service and Dr. Baugh Quiet excepted the patient. Patient's vital signs are stable. The findings were discussed with him and is agreeable to admission.   The possibility of blood transfusion was discussed with the patient as well. CRITICAL CARE TIME   Total Critical Care time was 39 minutes, excluding separately reportableprocedures. There was a high probability of clinicallysignificant/life threatening deterioration in the patient's condition which required my urgent intervention. CONSULTS:  IP CONSULT TO GI  IP CONSULT TO GI    PROCEDURES:  Unless otherwise noted below, none     Procedures    FINAL IMPRESSION      1. Acute upper gastrointestinal bleeding    2. Symptomatic anemia          DISPOSITION/PLAN   DISPOSITION Admitted 08/26/2021 04:02:57 PM      PATIENT REFERRED TO:  No follow-up provider specified.     DISCHARGE MEDICATIONS:  Current Discharge Medication List             (Please note that portions of this note were completed with a voice recognition program.  Efforts were made to edit the dictations but occasionally words are mis-transcribed.)    Maria E Garcias DO (electronically signed)  Attending Emergency Physician          Maria E Garcias DO  08/26/21 2922

## 2021-08-26 NOTE — ANESTHESIA POSTPROCEDURE EVALUATION
Department of Anesthesiology  Postprocedure Note    Patient: Seth Nowak  MRN: [id number]  YOB: 1980  Date of evaluation: 8/26/2021  Time:  4:54 PM     Procedure Summary     Date: 08/26/21 Room / Location: [facility]    Anesthesia Start: 7706 Anesthesia Stop: 1077    Procedure: ESOPHAGOGASTRODUODENOSCOPY. PT. CURRENTLY IN ER (N/A ) Diagnosis: (PAIN)    Surgeons: Angela Javier MD Responsible Provider: Sharla Chris MD    Anesthesia Type: MAC ASA Status: 2 - Emergent          Anesthesia Type: MAC    [place] Phase I:      [place] Phase II:      Last vitals: Reviewed and per EMR flowsheets.        Anesthesia Post Evaluation    Patient location during evaluation: PACU  Patient participation: complete - patient participated  Level of consciousness: awake  Pain score: 0  Airway patency: patent  Nausea & Vomiting: no nausea  Complications: no  Cardiovascular status: hemodynamically stable  Respiratory status: acceptable  Hydration status: euvolemic

## 2021-08-26 NOTE — PROGRESS NOTES
Patient seen and examined, full H&P to follow    A/P:  1 - Acute upper gi bleed with acute blood loss anemia   Underwent emergent EGD, ulcer clipped and cauterized.   Transfusing 2 units, await repeat H/H, PPI gtt, likely repeat EGD in 2 days per GI    2 - Asthma    3 - Hx opioid dependence

## 2021-08-26 NOTE — ED NOTES
Bed: 04  Expected date:   Expected time:   Means of arrival:   Comments:  38 m vomiting blood  108/61  121  20  100       [facility] [place]  08/26/21 8896

## 2021-08-27 ENCOUNTER — APPOINTMENT (OUTPATIENT)
Dept: ULTRASOUND IMAGING | Age: 41
DRG: 381 | End: 2021-08-27
Payer: COMMERCIAL

## 2021-08-27 LAB
ALBUMIN SERPL-MCNC: 2.7 G/DL (ref 3.5–4.6)
ALP BLD-CCNC: 49 U/L (ref 35–104)
ALT SERPL-CCNC: 265 U/L (ref 0–41)
ANION GAP SERPL CALCULATED.3IONS-SCNC: 5 MEQ/L (ref 9–15)
AST SERPL-CCNC: 147 U/L (ref 0–40)
BILIRUB SERPL-MCNC: 0.4 MG/DL (ref 0.2–0.7)
BLOOD BANK DISPENSE STATUS: NORMAL
BLOOD BANK PRODUCT CODE: NORMAL
BPU ID: NORMAL
BUN BLDV-MCNC: 33 MG/DL (ref 6–20)
CALCIUM SERPL-MCNC: 7.4 MG/DL (ref 8.5–9.9)
CHLORIDE BLD-SCNC: 104 MEQ/L (ref 95–107)
CO2: 23 MEQ/L (ref 20–31)
CREAT SERPL-MCNC: 0.81 MG/DL (ref 0.7–1.2)
DESCRIPTION BLOOD BANK: NORMAL
GFR AFRICAN AMERICAN: >60
GFR NON-AFRICAN AMERICAN: >60
GLOBULIN: 1.7 G/DL (ref 2.3–3.5)
GLUCOSE BLD-MCNC: 93 MG/DL (ref 70–99)
HAV IGM SER IA-ACNC: ABNORMAL
HBA1C MFR BLD: 5.7 % (ref 4.8–5.9)
HCT VFR BLD CALC: 21.9 % (ref 42–52)
HCT VFR BLD CALC: 22.2 % (ref 42–52)
HCT VFR BLD CALC: 23.2 % (ref 42–52)
HCT VFR BLD CALC: 23.4 % (ref 42–52)
HEMOGLOBIN: 7.6 G/DL (ref 14–18)
HEMOGLOBIN: 7.8 G/DL (ref 14–18)
HEMOGLOBIN: 8 G/DL (ref 14–18)
HEMOGLOBIN: 8.1 G/DL (ref 14–18)
HEPATITIS B CORE IGM ANTIBODY: ABNORMAL
HEPATITIS B SURFACE ANTIGEN INTERPRETATION: ABNORMAL
HEPATITIS C ANTIBODY INTERPRETATION: REACTIVE
HEPATITIS INTERPRETATION:: ABNORMAL
IRON SATURATION: 79 % (ref 11–46)
IRON: 178 UG/DL (ref 59–158)
POTASSIUM SERPL-SCNC: 4.2 MEQ/L (ref 3.4–4.9)
SODIUM BLD-SCNC: 132 MEQ/L (ref 135–144)
TOTAL IRON BINDING CAPACITY: 225 UG/DL (ref 178–450)
TOTAL PROTEIN: 4.4 G/DL (ref 6.3–8)

## 2021-08-27 PROCEDURE — 83550 IRON BINDING TEST: CPT

## 2021-08-27 PROCEDURE — 2580000003 HC RX 258: Performed by: FAMILY MEDICINE

## 2021-08-27 PROCEDURE — 6360000002 HC RX W HCPCS: Performed by: FAMILY MEDICINE

## 2021-08-27 PROCEDURE — 36415 COLL VENOUS BLD VENIPUNCTURE: CPT

## 2021-08-27 PROCEDURE — 83540 ASSAY OF IRON: CPT

## 2021-08-27 PROCEDURE — 85014 HEMATOCRIT: CPT

## 2021-08-27 PROCEDURE — 76705 ECHO EXAM OF ABDOMEN: CPT

## 2021-08-27 PROCEDURE — 2700000000 HC OXYGEN THERAPY PER DAY

## 2021-08-27 PROCEDURE — 80074 ACUTE HEPATITIS PANEL: CPT

## 2021-08-27 PROCEDURE — P9016 RBC LEUKOCYTES REDUCED: HCPCS

## 2021-08-27 PROCEDURE — 2060000000 HC ICU INTERMEDIATE R&B

## 2021-08-27 PROCEDURE — C9113 INJ PANTOPRAZOLE SODIUM, VIA: HCPCS | Performed by: FAMILY MEDICINE

## 2021-08-27 PROCEDURE — 80053 COMPREHEN METABOLIC PANEL: CPT

## 2021-08-27 PROCEDURE — 85018 HEMOGLOBIN: CPT

## 2021-08-27 PROCEDURE — 99232 SBSQ HOSP IP/OBS MODERATE 35: CPT | Performed by: SPECIALIST

## 2021-08-27 PROCEDURE — 36430 TRANSFUSION BLD/BLD COMPNT: CPT

## 2021-08-27 RX ORDER — LORAZEPAM 2 MG/ML
0.5 INJECTION INTRAMUSCULAR ONCE
Status: COMPLETED | OUTPATIENT
Start: 2021-08-28 | End: 2021-08-28

## 2021-08-27 RX ORDER — SODIUM CHLORIDE 9 MG/ML
INJECTION, SOLUTION INTRAVENOUS PRN
Status: DISCONTINUED | OUTPATIENT
Start: 2021-08-27 | End: 2021-08-28 | Stop reason: HOSPADM

## 2021-08-27 RX ADMIN — SODIUM CHLORIDE: 9 INJECTION, SOLUTION INTRAVENOUS at 03:48

## 2021-08-27 RX ADMIN — SODIUM CHLORIDE 8 MG/HR: 9 INJECTION, SOLUTION INTRAVENOUS at 03:46

## 2021-08-27 RX ADMIN — SODIUM CHLORIDE 8 MG/HR: 9 INJECTION, SOLUTION INTRAVENOUS at 17:30

## 2021-08-27 ASSESSMENT — PAIN SCALES - GENERAL: PAINLEVEL_OUTOF10: 0

## 2021-08-27 NOTE — H&P
Hospital Medicine  History and Physical    Patient:  Jeanette Parson  MRN: [id number]    CHIEF COMPLAINT:    Chief Complaint   Patient presents with    Abdominal Pain       History Obtained From:  patient  Primary Care Physician: Los Murry MD    HISTORY OF PRESENT ILLNESS:   The patient is a 47 y.o. male who presents with an episode of abdominal pain that began last night. He states he awoke feeling poorly, was very sweaty, had chills, and vomited blood once. He came to the ER for further evaluation. He was of normal health prior to this, did not eat anything out of the ordinary. He denies cp, sob. He last drank three days ago, and drinks 2-3 days per week. He has no history of ulcer. He denies NSAID use. Past Medical History:      Diagnosis Date    Bipolar 1 disorder [facility])        Past Surgical History:  History reviewed. No pertinent surgical history. Medications Prior to Admission:    Prior to Admission medications    Medication Sig Start Date End Date Taking? Authorizing Provider   ibuprofen (ADVIL;MOTRIN) 600 MG tablet Take 1 tablet by mouth every 6 hours as needed for Pain 8/29/17   Mayelin Benedict MD       Allergies:  Effexor [venlafaxine], Remeron [mirtazapine], and Seasonal    Social History:   TOBACCO:   reports that he has been smoking cigarettes. He has a 10.00 pack-year smoking history. He does not have any smokeless tobacco history on file. ETOH:   reports current alcohol use. Family History:   History reviewed. No pertinent family history. REVIEW OF SYSTEMS:  Ten systems reviewed and negative except for as above. Physical Exam:    Vitals: /63   Pulse 106   Temp 97.1 °F (36.2 °C) (Oral)   Resp 15   Ht 5' 8\" (1.727 m)   Wt 170 lb (77.1 kg)   SpO2 99%   BMI 25.85 kg/m²   Constitutional: alert, appears stated age and cooperative  Skin: Skin color, texture, turgor normal. No rashes or lesions  Eyes:Eye: Normal external eye, conjunctiva, ALICIA.   ENT: Head: Normocephalic, no lesions, without obvious abnormality. Neck: no adenopathy, no carotid bruit, no JVD, supple, symmetrical, trachea midline and thyroid not enlarged, symmetric, no tenderness/mass/nodules  Respiratory: clear to auscultation bilaterally  Cardiovascular: regular rate and rhythm, S1, S2 normal, no murmur, click, rub or gallop  Gastrointestinal: soft, mild epigastric tenderness to palpation; bowel sounds normal; no masses,  no organomegaly  Genitourinary: Deferred  Musculoskeletal:extremities normal, atraumatic, no cyanosis or edema  Neurologic: Mental status AAOx3 No facial asymmetry or droop. Normal muscle strength b/l. CN II-XII grossly intact. Psychiatric: Appropriate mood and affect. Good insight and judgement  Hematologic: No obvious bruising or bleeding    Recent Labs     08/26/21  1435 08/26/21  2321   WBC 12.9*  --    HGB 4.9* 6.6*     --      Recent Labs     08/26/21  1435   *   K 4.8   CL 89*   CO2 23   BUN 72*   CREATININE 1.33*   GLUCOSE 117*   *   *   BILITOT 0.3   ALKPHOS 52     INR:   Recent Labs     08/26/21  1435   INR 1.2     URINALYSIS:  Recent Labs     08/26/21  1430   COLORU Yellow   PHUR 6.0   CLARITYU Clear   SPECGRAV 1.014   LEUKOCYTESUR Negative   UROBILINOGEN 0.2   BILIRUBINUR Negative   BLOODU Negative   GLUCOSEU 100*     -----------------------------------------------------------------   XR CHEST PORTABLE    Result Date: 8/26/2021  EXAMINATION: XR CHEST PORTABLE CLINICAL HISTORY: SWEATING. FEVERISH. VOMITING. COMPARISONS: None available. FINDINGS: Osseous structures intact. Cardiopericardial silhouette normal. Pulmonary vasculature normal. Lungs clear. NO ACUTE CARDIOPULMONARY DISEASE. Assessment and Plan   1 - Acute upper gi bleed with acute blood loss anemia              Underwent emergent EGD, ulcer clipped and cauterized. Transfusing 2 units, await repeat H/H, PPI gtt, likely repeat EGD in 2 days per GI.   ICU admission     # - Hepatitis   Presumed alcoholic, recheck lfts in am, check hepatitis panel    2 - Asthma     3 - Hx opioid dependence         Patient Active Problem List   Diagnosis Code    Acute GI bleeding K92.2       Gauri Rao MD, MD  Admitting Hospitalist    Emergency Contact:

## 2021-08-27 NOTE — FLOWSHEET NOTE
0310 - pt to the floor via wheelchair. Walked to the bed. Assessment done. No distress noted at this point.

## 2021-08-27 NOTE — PROGRESS NOTES
Progress Note  Date:2021       Room:[place] [address]  Patient Name:Satnam Carvajal     YOB: 1980     Age:40 y.o. Subjective    Subjective feels better no emesis or any bleeding through the rectum  Review of Systems vital signs are stable  Objective most recent hemoglobin hematocrit 7.6 and 21.9, has abnormal transaminases and hep C antibody was positive         Vitals Last 24 Hours:  TEMPERATURE:  Temp  Av °F (36.7 °C)  Min: 97 °F (36.1 °C)  Max: 98.8 °F (37.1 °C)  RESPIRATIONS RANGE: Resp  Av.9  Min: 0  Max: 22  PULSE OXIMETRY RANGE: SpO2  Av.9 %  Min: 78 %  Max: 100 %  PULSE RANGE: Pulse  Av.2  Min: 0  Max: 115  BLOOD PRESSURE RANGE: Systolic (30LMS), OIU:541 , Min:83 , DWP:112   ; Diastolic (70UKK), MOA:52, Min:39, Max:69    I/O (24Hr): Intake/Output Summary (Last 24 hours) at 2021 1311  Last data filed at 2021 0419  Gross per 24 hour   Intake 800 ml   Output 3675 ml   Net -2875 ml     Objective  Labs/Imaging/Diagnostics    Labs:  CBC:  Recent Labs     21  1435 21  1435 21  2321 21  0345 21  1020   WBC 12.9*  --   --   --   --    RBC 1.59*  --   --   --   --    HGB 4.9*   < > 6.6* 8.0* 7.6*   HCT 13.9*   < > 19.1* 23.4* 21.9*   MCV 87.3  --   --   --   --    RDW 13.4  --   --   --   --      --   --   --   --     < > = values in this interval not displayed. CHEMISTRIES:  Recent Labs     21  1435 21  0345   * 132*   K 4.8 4.2   CL 89* 104   CO2 23 23   BUN 72* 33*   CREATININE 1.33* 0.81   GLUCOSE 117* 93   MG 2.2  --      PT/INR:  Recent Labs     21  1435   PROTIME 15.3*   INR 1.2     APTT:  Recent Labs     21  1435   APTT 25.2     LIVER PROFILE:  Recent Labs     21  1435 21  0345   * 147*   * 265*   BILITOT 0.3 0.4   ALKPHOS 52 49       Imaging Last 24 Hours:  XR CHEST PORTABLE    Result Date: 2021  EXAMINATION: XR CHEST PORTABLE CLINICAL HISTORY: SWEATING. FEVERISH. VOMITING. COMPARISONS: None available. FINDINGS: Osseous structures intact. Cardiopericardial silhouette normal. Pulmonary vasculature normal. Lungs clear. NO ACUTE CARDIOPULMONARY DISEASE. Assessment//Plan           Hospital Problems         Last Modified POA    Acute GI bleeding 8/26/2021 Yes        Assessment & Plan  GI bleeding from ulcer EG junction, no signs of active GI bleeding now. Abdominal transaminases probably chronic hep C. Will start full liquid diet order right upper quadrant ultrasound continue IV Protonix, will plan for repeat EGD in the next 48 hours.   Electronically signed by Jolanta Nelson MD on 8/27/21 at 1:11 PM EDT

## 2021-08-28 VITALS
TEMPERATURE: 97.7 F | BODY MASS INDEX: 25.76 KG/M2 | DIASTOLIC BLOOD PRESSURE: 78 MMHG | RESPIRATION RATE: 18 BRPM | OXYGEN SATURATION: 98 % | HEIGHT: 68 IN | HEART RATE: 86 BPM | SYSTOLIC BLOOD PRESSURE: 133 MMHG | WEIGHT: 170 LBS

## 2021-08-28 LAB
ALBUMIN SERPL-MCNC: 3 G/DL (ref 3.5–4.6)
ALP BLD-CCNC: 56 U/L (ref 35–104)
ALT SERPL-CCNC: 193 U/L (ref 0–41)
ANION GAP SERPL CALCULATED.3IONS-SCNC: 10 MEQ/L (ref 9–15)
AST SERPL-CCNC: 68 U/L (ref 0–40)
BILIRUB SERPL-MCNC: <0.2 MG/DL (ref 0.2–0.7)
BUN BLDV-MCNC: 12 MG/DL (ref 6–20)
CALCIUM SERPL-MCNC: 7.8 MG/DL (ref 8.5–9.9)
CHLORIDE BLD-SCNC: 105 MEQ/L (ref 95–107)
CO2: 21 MEQ/L (ref 20–31)
CREAT SERPL-MCNC: 0.79 MG/DL (ref 0.7–1.2)
GFR AFRICAN AMERICAN: >60
GFR NON-AFRICAN AMERICAN: >60
GLOBULIN: 2.3 G/DL (ref 2.3–3.5)
GLUCOSE BLD-MCNC: 130 MG/DL (ref 70–99)
HCT VFR BLD CALC: 22.4 % (ref 42–52)
HCT VFR BLD CALC: 23.5 % (ref 42–52)
HCT VFR BLD CALC: 24.1 % (ref 42–52)
HEMOGLOBIN: 7.7 G/DL (ref 14–18)
HEMOGLOBIN: 7.9 G/DL (ref 14–18)
HEMOGLOBIN: 8.3 G/DL (ref 14–18)
POTASSIUM SERPL-SCNC: 3.8 MEQ/L (ref 3.4–4.9)
SODIUM BLD-SCNC: 136 MEQ/L (ref 135–144)
TOTAL PROTEIN: 5.3 G/DL (ref 6.3–8)

## 2021-08-28 PROCEDURE — 36415 COLL VENOUS BLD VENIPUNCTURE: CPT

## 2021-08-28 PROCEDURE — 85014 HEMATOCRIT: CPT

## 2021-08-28 PROCEDURE — 85018 HEMOGLOBIN: CPT

## 2021-08-28 PROCEDURE — C9113 INJ PANTOPRAZOLE SODIUM, VIA: HCPCS | Performed by: FAMILY MEDICINE

## 2021-08-28 PROCEDURE — 80053 COMPREHEN METABOLIC PANEL: CPT

## 2021-08-28 PROCEDURE — 2580000003 HC RX 258: Performed by: FAMILY MEDICINE

## 2021-08-28 PROCEDURE — 2700000000 HC OXYGEN THERAPY PER DAY

## 2021-08-28 PROCEDURE — 6370000000 HC RX 637 (ALT 250 FOR IP): Performed by: FAMILY MEDICINE

## 2021-08-28 PROCEDURE — 99232 SBSQ HOSP IP/OBS MODERATE 35: CPT | Performed by: SPECIALIST

## 2021-08-28 PROCEDURE — 6360000002 HC RX W HCPCS: Performed by: INTERNAL MEDICINE

## 2021-08-28 PROCEDURE — 6360000002 HC RX W HCPCS: Performed by: FAMILY MEDICINE

## 2021-08-28 RX ORDER — SODIUM CHLORIDE 0.9 % (FLUSH) 0.9 %
5-40 SYRINGE (ML) INJECTION EVERY 12 HOURS SCHEDULED
Status: CANCELLED | OUTPATIENT
Start: 2021-08-28

## 2021-08-28 RX ORDER — SODIUM CHLORIDE 0.9 % (FLUSH) 0.9 %
5-40 SYRINGE (ML) INJECTION PRN
Status: CANCELLED | OUTPATIENT
Start: 2021-08-28

## 2021-08-28 RX ORDER — SODIUM CHLORIDE 9 MG/ML
25 INJECTION, SOLUTION INTRAVENOUS PRN
Status: CANCELLED | OUTPATIENT
Start: 2021-08-28

## 2021-08-28 RX ORDER — PANTOPRAZOLE SODIUM 40 MG/1
40 TABLET, DELAYED RELEASE ORAL
Qty: 60 TABLET | Refills: 2 | Status: SHIPPED | OUTPATIENT
Start: 2021-08-28

## 2021-08-28 RX ORDER — LORAZEPAM 1 MG/1
1 TABLET ORAL ONCE
Status: COMPLETED | OUTPATIENT
Start: 2021-08-28 | End: 2021-08-28

## 2021-08-28 RX ADMIN — LORAZEPAM 1 MG: 1 TABLET ORAL at 10:24

## 2021-08-28 RX ADMIN — SODIUM CHLORIDE 8 MG/HR: 9 INJECTION, SOLUTION INTRAVENOUS at 06:45

## 2021-08-28 RX ADMIN — LORAZEPAM 0.5 MG: 2 INJECTION INTRAMUSCULAR; INTRAVENOUS at 00:33

## 2021-08-28 RX ADMIN — SODIUM CHLORIDE 8 MG/HR: 9 INJECTION, SOLUTION INTRAVENOUS at 17:10

## 2021-08-28 NOTE — PROGRESS NOTES
[address] care of pt from [facility], [address] [facility]. Pt requesting to leave AMA, but pt is pink slipped due to risk of harming himself. 1:1 in place. Per Dr. Sagar Orozco okay for pt to eat. 2350 Pt states he is feeling very anxious. Dr. Sagar Orozco notified via perfect serve and one time order for 0.5mg ativan received. Plan for pt to remain NPO after midnight for possible repeat EGD.    0315 Pt requesting to drink despite possible EGD tomorrow. Dr. Sagar Orozco notified via perfect serve and given okay to drink.

## 2021-08-28 NOTE — PROGRESS NOTES
Lazarus Marine is a 47 y.o. male patient. Current Facility-Administered Medications   Medication Dose Route Frequency Provider Last Rate Last Admin    0.9 % sodium chloride infusion   IntraVENous PRN Kash Pickett MD        0.9 % sodium chloride infusion   IntraVENous PRN Ana Fuentes,         sodium chloride flush 0.9 % injection 5-40 mL  5-40 mL IntraVENous 2 times per day Pasha Lovett MD   10 mL at 08/26/21 1942    sodium chloride flush 0.9 % injection 5-40 mL  5-40 mL IntraVENous PRN Pasha Lovett MD        0.9 % sodium chloride infusion  25 mL IntraVENous PRN Pasha Lovett MD        ondansetron (ZOFRAN-ODT) disintegrating tablet 4 mg  4 mg Oral Q8H PRN Pasha Lovett MD        Or    ondansetron [facility]) injection 4 mg  4 mg IntraVENous Q6H PRN Pasha Lovett MD        acetaminophen (TYLENOL) tablet 650 mg  650 mg Oral Q6H PRN Pasha Lovett MD        Or    acetaminophen (TYLENOL) suppository 650 mg  650 mg Rectal Q6H PRN Pasha Lovett MD        pantoprazole (PROTONIX) 80 mg in sodium chloride 0.9 % 100 mL infusion  8 mg/hr IntraVENous Continuous Pasha Lovett MD 10 mL/hr at 08/28/21 0645 8 mg/hr at 08/28/21 0645    [START ON 8/29/2021] pantoprazole (PROTONIX) tablet 40 mg  40 mg Oral QAM AC Pasha Lovett MD         Allergies   Allergen Reactions    Effexor [Venlafaxine]     Remeron [Mirtazapine]     Seasonal      Active Problems:    Acute GI bleeding  Resolved Problems:    * No resolved hospital problems. *    Blood pressure 131/70, pulse 85, temperature 98.8 °F (37.1 °C), temperature source Oral, resp. rate 18, height 5' 8\" (1.727 m), weight 170 lb (77.1 kg), SpO2 100 %. Subjective no emesis or melena.,  Tolerating clear liquids well  Objective most recent hemoglobin hematocrit 7.9 and 23.5, liver ultrasound showed no focal liver lesions. Assessment & Plan bleeding EG junction ulcer, previous EGD was suboptimal because of old blood in the stomach. Ralf Hilt Will schedule follow-up EGD a.m.,  Patient has chronic hep C which can be managed later as an outpatient. Patient is awaiting psych evaluation.   Katharina Post MD  8/28/2021

## 2021-08-28 NOTE — PROGRESS NOTES
Patient up for walk on unit. [place] remained steady. Stayed in common area by nursing station. Patient is very labile throughout shift. Doctor came to bedside to explain EDG tomorrow and NPO after midnight. After doctor left, patient made statement \"when that doctor comes back tomorrow to stick that down my throat, Im going to stick it up his ass\". Patient angry about not having it done today, patient reminded he was not NPO as would be needed. Reminded of importance of NPO so procedure may be done. Patient asked for clarification from this nurse about why he is pink slipped, informed there were concerns for his safety brought up by outside sources. He state he didn't need an admission for his mental health. When nurse tried to speak to patient about his high anxiety, threats to doctors, or insomnia, he become agitated and wont speak. He appears to lack any insight into his actions, substance abuse, or need for mental health services.

## 2021-08-28 NOTE — PROGRESS NOTES
Hospitalist Daily Progress Note  Name: Bradford Wise  Age: 47 y.o. Gender: male  CodeStatus: Full Code  Allergies: Effexor [Venlafaxine]  Remeron [Mirtazapine]  Seasonal    Chief Complaint:Abdominal Pain      Primary Care Provider: Katrinka Lennox, MD    InpatientTreatment Team: Treatment Team: Attending Provider: Lb Bridges MD; Consulting Physician: Ariadna Vee MD; Surgeon: Ariadna Vee MD; : JOE Quesada; Utilization Reviewer: Esme Liang RN; Respiratory Therapist (Day): David Sylvester RCP; Registered Nurse: Ольга Amor, RN; Registered Nurse: Yasmin Aleman, RN; Patient Care Tech: Kendal Maria; LPN: Jacqueline Gay LPN    Admission Date: 8/26/2021      Subjective: No chest pain, sob, nausea. Resting in bed. Physical Exam  Vitals and nursing note reviewed. Constitutional:       Appearance: Normal appearance. Cardiovascular:      Rate and Rhythm: Normal rate and regular rhythm. Pulmonary:      Effort: Pulmonary effort is normal.      Breath sounds: Normal breath sounds. Abdominal:      General: Bowel sounds are normal.      Palpations: Abdomen is soft. Musculoskeletal:         General: Normal range of motion. Skin:     General: Skin is warm and dry. Neurological:      Mental Status: He is alert and oriented to person, place, and time. Mental status is at baseline.          Medications:  Reviewed    Infusion Medications:    sodium chloride      sodium chloride      sodium chloride      pantoprozole (PROTONIX) infusion 8 mg/hr (08/27/21 1730)     Scheduled Medications:    sodium chloride flush  5-40 mL IntraVENous 2 times per day    [START ON 8/29/2021] pantoprazole  40 mg Oral QAM AC     PRN Meds: sodium chloride, sodium chloride, sodium chloride flush, sodium chloride, ondansetron **OR** ondansetron, acetaminophen **OR** acetaminophen    Labs:   Recent Labs     08/26/21  1435 08/26/21  2321 08/27/21  1020 08/27/21  1734 08/27/21  2033   WBC 12.9*  --   --   --   --    HGB 4.9*   < > 7.6* 8.1* 7.8*   HCT 13.9*   < > 21.9* 23.2* 22.2*     --   --   --   --     < > = values in this interval not displayed. Recent Labs     08/26/21  1435 08/27/21  0345   * 132*   K 4.8 4.2   CL 89* 104   CO2 23 23   BUN 72* 33*   CREATININE 1.33* 0.81   CALCIUM 7.0* 7.4*     Recent Labs     08/26/21  1435 08/27/21  0345   * 147*   * 265*   BILITOT 0.3 0.4   ALKPHOS 52 49     Recent Labs     08/26/21  1435   INR 1.2     Recent Labs     08/26/21  1435   CKTOTAL 462*       Urinalysis:   Lab Results   Component Value Date    NITRU Negative 08/26/2021    WBCUA 0-2 08/10/2016    BACTERIA Few 08/08/2015    RBCUA 3-5 08/10/2016    BLOODU Negative 08/26/2021    SPECGRAV 1.014 08/26/2021    GLUCOSEU 100 08/26/2021    GLUCOSEU NEG 12/30/2011       Radiology:   Most recent    Chest CT      WITH CONTRAST:No results found for this or any previous visit. WITHOUT CONTRAST: No results found for this or any previous visit. CXR      2-view: No results found for this or any previous visit. Portable: Results for orders placed during the hospital encounter of 08/26/21    XR CHEST PORTABLE    Narrative  EXAMINATION: XR CHEST PORTABLE    CLINICAL HISTORY: SWEATING. FEVERISH. VOMITING. COMPARISONS: None available. FINDINGS: Osseous structures intact. Cardiopericardial silhouette normal. Pulmonary vasculature normal. Lungs clear. Impression  NO ACUTE CARDIOPULMONARY DISEASE. Echo No results found for this or any previous visit. Assessment/Plan:    Active Hospital Problems    Diagnosis Date Noted    Acute GI bleeding [K92.2] 08/26/2021     1 - Acute upper gi bleed with acute blood loss anemia              Underwent emergent EGD, ulcer clipped and cauterized.  Transfusing 2 units, await repeat H/H, PPI gtt, likely repeat EGD in 2 days per GI. ICU admission   8/27 - h/h improved, but slowly dropping, monitor.   Plan repeat egd in 1-2 days per GI     # - Hepatitis              Presumed alcoholic, recheck lfts in am, check hepatitis panel    8/27 - hep c on panel.   Outpt f/u    2 - Asthma     3 - Hx opioid dependence       Electronically signed by Juni Almaraz MD on 8/28/2021 at 1:50 AM

## 2021-08-28 NOTE — FLOWSHEET NOTE
4093- Pt currently a 1;1 with sitter at bedside. Pt c/o anxiety and requesting medication for it. Message sent to Dr Jose G Mcnamara, awaiting response    [phone]- OK to DC home per Dr Jose G Mcnamara. He stated he talked with consultants as well. DC instructions given to pt, pt verbalized understanding of info.  Pt on the phone with sister Veena Leija and she's setting up an uber per pt    [phone]- Pt walked to front door where ride is picking her up by PCA Electronically signed by Russ Tavares RN on 8/28/2021 at 6:32 PM

## 2021-08-28 NOTE — PROGRESS NOTES
The nurse taking care of the patient called to inform you that the patient would like to go [address] I talked to the patient and he was insisting to go. He said that he does not have a ride and no shoes. However, he is willing to walk she felt like. I told the patient that it is not safe to walk that distance from the hospital.  However, he insisted that he is able to walk. Patient definitely had poor judgment but there was no reason to hold him against his will. However, as patient was getting ready to leave his brother called and said that patient should not leave without psych seeing him because he will cause harm to himself and others if he leaves. He said that he has tendency to go and use nice and cut himself. He also believed that he might hurt someone if he leaves. Given the information received from the brother, patient must stay in the hospital until psych can clear him to go. We will pink slipped the patient and place a sitter. I talked to the patient and explained to him that he must stay overnight for psych to see him in the morning. Initially he was angry and said that I should not believe anyone or anything anyone says. I did not tell the patient who told me that he might hurt someone or self if he leaves. Patient agreed to stay to be seen by psych if he gets food to eat.       Electronically signed by Marcel Chapa MD on 8/27/2021 at 11:02 PM

## 2021-08-28 NOTE — CARE COORDINATION
DISCUSSED WITH DR KRISHNA, PLAN FOR REPEAT EGD IN AM.  PT CURRENTLY PINK SLIPPED D/T EVENT YESTERDAY EVENING. AWAITING PSYCH EVAL FOR PLAN, HOME VS .
[facility] AT [facility] Case Management Initial Discharge Assessment    Met with Patient to discuss discharge plan. PCP: India Haas MD                                Date of Last Visit: \"NOT IN A LONG TIME\"    If no PCP, list provided? N/A    Discharge Planning    Living Arrangements: independently at home    Who do you live with? STAYING WITH GRANDMOTHER TEMPORARILY    Who helps you with your care:  self    If lives at home:     Do you have any barriers navigating in your home? no    Patient can perform ADL? Yes    Current Services (outpatient and in home) :  None    Dialysis: No    Is transportation available to get to your appointments? Yes    DME Equipment:  no    Respiratory equipment: None    Respiratory provider:  no     Pharmacy:  yes - [address] [place] [address] with Medication Assistance Program?  Yes      Patient agreeable to [place] [address]? Declined    Patient agreeable to SNF/Rehab? Declined    Other discharge needs identified? Other LET'S GET REAL---PT DECLINED. NOTIFIED JOE Calvert    Does Patient Have a High-Risk for Readmission Diagnosis (CHF, PN, MI, COPD)? No    Initial Discharge Plan? (Note: please see concurrent daily documentation for any updates after initial note).     HOME W/GRANDMOTHER    Readmission Risk              Risk of Unplanned Readmission:  7         Electronically signed by Matias Suárez RN on 8/27/2021 at 10:26 AM
Principal Discharge DX:	Encounter for medical screening examination

## 2021-08-28 NOTE — PROGRESS NOTES
Assumed 1:1 sitter for this pt; Pt is calm most of the time,then becomes very restless and tosses & turns throughout the night. Up to BR X3 Throughout the night., BM X1. Up to nurses station X2, Dr cleared pt to have sherbert & clear liquids. Pt is cooperative,requesting to have breakfast this am.

## 2021-08-28 NOTE — PROGRESS NOTES
0900 pt assessment completed, VSS, no active signs of bleeding. Pt A&OX3, up steady. 1400 Pt resting in bed, down to US via cart. Diet advanced to Full liquids. 1800 pt up walking in halls, no requests at this time. 2100 Pt up to nurses station stating he is signing out AMA and wants to leave. He states he does not have a ride. Giselle Resendiz supervisor notified and Dr. Cristel Cantu also notified. Called his sister Quita Dunn (has HIPPA code) and informed her. She states he has an extensive psych history along with drug abuse and does not think he should leave. She attempted to talk to pt but he hung up on her. 2130 Dr. Cristel Cantu in to see pt, pt states he does not have a ride, clothes or shoes and is ok to walk to [facility]. Spoke to Giselle Resendiz supervisor and will get pt a taxi voucher. 2200 [employer] called and will be here around 2330. Pts ivs removed and AMA paper signed. 18 Pts brother Narda Cordova called, I informed him I could not give him any information but could listen to his many concerns for pts safety if discharged. He fears the pt may harm himself or someone else if discharged. Pts plans on going to their 80year old grandmothers house and family is concerned for her safety. Dr. Cristel Cantu spoke with brother. Pt to be pink slipped until evaluated by psych. Dr. Cristel Cantu spoke with pt and he is ok to stay but wants to eat. Pt given food. He is calm in bed. Sitter at bedside. New iv placed and protonix gtt restarted. 18 Pts sister Sera called, she does not have HIPPA code. I informed her I could not give her any information. She was very understanding yet wanted me to know that if pt is discharged, he would probably harm himself. He has a history of self mutilation and drug abuse. Was recently in rehab and signed himself out. Pt states it is ok to give the HIPPA code to his sister Sera if she calls tomorrow.   Electronically signed by Gerson Parry RN on 8/28/2021 at 12:25 AM

## 2021-08-30 ENCOUNTER — TELEPHONE (OUTPATIENT)
Dept: PRIMARY CARE CLINIC | Age: 41
End: 2021-08-30

## 2021-08-30 NOTE — TELEPHONE ENCOUNTER
[facility] Transitions Initial Follow Up Call    Outreach made within 2 business days of discharge: Yes    Patient: Cat De Patient : 1980   MRN: [id number]  Reason for Admission: There are no discharge diagnoses documented for the most recent discharge. Discharge Date: 21       Spoke with: patient    Discharge department/facility: [facility] Interactive Patient Contact:  Was patient able to fill all prescriptions: Yes  Was patient instructed to bring all medications to the follow-up visit: Yes  Is patient taking all medications as directed in the discharge summary?  Yes  Does patient understand their discharge instructions: Yes  Does patient have questions or concerns that need addressed prior to 7-14 day follow up office visit: no    Scheduled appointment with PCP within 7-14 days    Follow Up  Future Appointments   Date Time Provider Nati Mccloud   2021 11:30 AM Jono Urias MD [address] [place] [address]

## 2021-09-01 ENCOUNTER — TELEPHONE (OUTPATIENT)
Dept: PRIMARY CARE CLINIC | Age: 41
End: 2021-09-01

## 2021-09-01 NOTE — TELEPHONE ENCOUNTER
[facility] Transitions Initial Follow Up Call    Outreach made within 2 business days of discharge: Yes    Patient: Belle Sanabria Patient : 1980   MRN: [id number]  Reason for Admission: There are no discharge diagnoses documented for the most recent discharge. Discharge Date: 21       Spoke with: patient    Discharge department/facility: [facility] Interactive Patient Contact:  Was patient able to fill all prescriptions: Yes  Was patient instructed to bring all medications to the follow-up visit: Yes  Is patient taking all medications as directed in the discharge summary?  Yes  Does patient understand their discharge instructions: Yes  Does patient have questions or concerns that need addressed prior to 7-14 day follow up office visit: no    Scheduled appointment with PCP within 7-14 days    Follow Up  Future Appointments   Date Time Provider Nati Mccloud   2021 11:30 AM Iliana Bautista MD [address] [place] [address]

## 2021-09-08 ENCOUNTER — HOSPITAL ENCOUNTER (INPATIENT)
Age: 41
LOS: 8 days | Discharge: HOME | DRG: 885 | End: 2021-09-16
Attending: EMERGENCY MEDICINE | Admitting: PSYCHIATRY & NEUROLOGY
Payer: COMMERCIAL

## 2021-09-08 DIAGNOSIS — M54.42 CHRONIC RIGHT-SIDED LOW BACK PAIN WITH BILATERAL SCIATICA: ICD-10-CM

## 2021-09-08 DIAGNOSIS — F31.9 BIPOLAR 1 DISORDER, DEPRESSED (HCC): Primary | ICD-10-CM

## 2021-09-08 DIAGNOSIS — M54.41 CHRONIC RIGHT-SIDED LOW BACK PAIN WITH BILATERAL SCIATICA: ICD-10-CM

## 2021-09-08 DIAGNOSIS — G89.29 CHRONIC RIGHT-SIDED LOW BACK PAIN WITH BILATERAL SCIATICA: ICD-10-CM

## 2021-09-08 DIAGNOSIS — M79.605 PAINFUL LEGS AND MOVING TOES: ICD-10-CM

## 2021-09-08 DIAGNOSIS — M79.10 MUSCLE SORENESS: ICD-10-CM

## 2021-09-08 DIAGNOSIS — G89.29 CHRONIC RIGHT-SIDED LOW BACK PAIN WITH RIGHT-SIDED SCIATICA: ICD-10-CM

## 2021-09-08 DIAGNOSIS — M54.41 CHRONIC RIGHT-SIDED LOW BACK PAIN WITH RIGHT-SIDED SCIATICA: ICD-10-CM

## 2021-09-08 DIAGNOSIS — G60.9 IDIOPATHIC NEUROPATHY: ICD-10-CM

## 2021-09-08 DIAGNOSIS — M79.604 PAINFUL LEGS AND MOVING TOES: ICD-10-CM

## 2021-09-08 DIAGNOSIS — M21.40 ACQUIRED FLEXIBLE FLAT FOOT, UNSPECIFIED LATERALITY: ICD-10-CM

## 2021-09-08 LAB
ACETAMINOPHEN LEVEL: <5 UG/ML (ref 10–30)
ALBUMIN SERPL-MCNC: 3.7 G/DL (ref 3.5–4.6)
ALP BLD-CCNC: 72 U/L (ref 35–104)
ALT SERPL-CCNC: 21 U/L (ref 0–41)
AMPHETAMINE SCREEN, URINE: POSITIVE
ANION GAP SERPL CALCULATED.3IONS-SCNC: 8 MEQ/L (ref 9–15)
AST SERPL-CCNC: 15 U/L (ref 0–40)
BARBITURATE SCREEN URINE: ABNORMAL
BASOPHILS ABSOLUTE: 0 K/UL (ref 0–0.2)
BASOPHILS RELATIVE PERCENT: 0.7 %
BENZODIAZEPINE SCREEN, URINE: ABNORMAL
BILIRUB SERPL-MCNC: 0.3 MG/DL (ref 0.2–0.7)
BILIRUBIN URINE: NEGATIVE
BLOOD, URINE: NEGATIVE
BUN BLDV-MCNC: 8 MG/DL (ref 6–20)
CALCIUM SERPL-MCNC: 8.4 MG/DL (ref 8.5–9.9)
CANNABINOID SCREEN URINE: POSITIVE
CHLORIDE BLD-SCNC: 107 MEQ/L (ref 95–107)
CHOLESTEROL, TOTAL: 130 MG/DL (ref 0–199)
CK MB: 2.2 NG/ML (ref 0–6.7)
CLARITY: CLEAR
CO2: 24 MEQ/L (ref 20–31)
COCAINE METABOLITE SCREEN URINE: ABNORMAL
COLOR: YELLOW
CREAT SERPL-MCNC: 0.65 MG/DL (ref 0.7–1.2)
CREATINE KINASE-MB INDEX: 3.9 % (ref 0–3.5)
EOSINOPHILS ABSOLUTE: 0.2 K/UL (ref 0–0.7)
EOSINOPHILS RELATIVE PERCENT: 3.1 %
ETHANOL PERCENT: NORMAL G/DL
ETHANOL: <10 MG/DL (ref 0–0.08)
GFR AFRICAN AMERICAN: >60
GFR NON-AFRICAN AMERICAN: >60
GLOBULIN: 2.2 G/DL (ref 2.3–3.5)
GLUCOSE BLD-MCNC: 99 MG/DL (ref 70–99)
GLUCOSE URINE: NEGATIVE MG/DL
HCT VFR BLD CALC: 29.7 % (ref 42–52)
HDLC SERPL-MCNC: 55 MG/DL (ref 40–59)
HEMOGLOBIN: 10 G/DL (ref 14–18)
KETONES, URINE: NEGATIVE MG/DL
LDL CHOLESTEROL CALCULATED: 65 MG/DL (ref 0–129)
LEUKOCYTE ESTERASE, URINE: NEGATIVE
LYMPHOCYTES ABSOLUTE: 1.3 K/UL (ref 1–4.8)
LYMPHOCYTES RELATIVE PERCENT: 22.1 %
Lab: ABNORMAL
MCH RBC QN AUTO: 30.8 PG (ref 27–31.3)
MCHC RBC AUTO-ENTMCNC: 33.6 % (ref 33–37)
MCV RBC AUTO: 91.5 FL (ref 80–100)
METHADONE SCREEN, URINE: ABNORMAL
MONOCYTES ABSOLUTE: 0.7 K/UL (ref 0.2–0.8)
MONOCYTES RELATIVE PERCENT: 11.5 %
NEUTROPHILS ABSOLUTE: 3.7 K/UL (ref 1.4–6.5)
NEUTROPHILS RELATIVE PERCENT: 62.6 %
NITRITE, URINE: NEGATIVE
OPIATE SCREEN URINE: ABNORMAL
OXYCODONE URINE: ABNORMAL
PDW BLD-RTO: 15.9 % (ref 11.5–14.5)
PH UA: 6.5 (ref 5–9)
PHENCYCLIDINE SCREEN URINE: ABNORMAL
PLATELET # BLD: 749 K/UL (ref 130–400)
POTASSIUM SERPL-SCNC: 4.3 MEQ/L (ref 3.4–4.9)
PROPOXYPHENE SCREEN: ABNORMAL
PROTEIN UA: NEGATIVE MG/DL
RBC # BLD: 3.24 M/UL (ref 4.7–6.1)
SALICYLATE, SERUM: <0.3 MG/DL (ref 15–30)
SARS-COV-2, NAAT: NOT DETECTED
SODIUM BLD-SCNC: 139 MEQ/L (ref 135–144)
SPECIFIC GRAVITY UA: 1.01 (ref 1–1.03)
TOTAL CK: 56 U/L (ref 0–190)
TOTAL PROTEIN: 5.9 G/DL (ref 6.3–8)
TRIGL SERPL-MCNC: 52 MG/DL (ref 0–150)
TSH SERPL DL<=0.05 MIU/L-ACNC: 0.78 UIU/ML (ref 0.44–3.86)
URINE REFLEX TO CULTURE: NORMAL
UROBILINOGEN, URINE: 0.2 E.U./DL
WBC # BLD: 6 K/UL (ref 4.8–10.8)

## 2021-09-08 PROCEDURE — 85025 COMPLETE CBC W/AUTO DIFF WBC: CPT

## 2021-09-08 PROCEDURE — 82553 CREATINE MB FRACTION: CPT

## 2021-09-08 PROCEDURE — 80179 DRUG ASSAY SALICYLATE: CPT

## 2021-09-08 PROCEDURE — 36415 COLL VENOUS BLD VENIPUNCTURE: CPT

## 2021-09-08 PROCEDURE — 80143 DRUG ASSAY ACETAMINOPHEN: CPT

## 2021-09-08 PROCEDURE — 84443 ASSAY THYROID STIM HORMONE: CPT

## 2021-09-08 PROCEDURE — 99285 EMERGENCY DEPT VISIT HI MDM: CPT

## 2021-09-08 PROCEDURE — 80061 LIPID PANEL: CPT

## 2021-09-08 PROCEDURE — 93005 ELECTROCARDIOGRAM TRACING: CPT | Performed by: PSYCHIATRY & NEUROLOGY

## 2021-09-08 PROCEDURE — 82550 ASSAY OF CK (CPK): CPT

## 2021-09-08 PROCEDURE — 80053 COMPREHEN METABOLIC PANEL: CPT

## 2021-09-08 PROCEDURE — 80307 DRUG TEST PRSMV CHEM ANLYZR: CPT

## 2021-09-08 PROCEDURE — 6370000000 HC RX 637 (ALT 250 FOR IP): Performed by: PHYSICIAN ASSISTANT

## 2021-09-08 PROCEDURE — 1240000000 HC EMOTIONAL WELLNESS R&B

## 2021-09-08 PROCEDURE — 87635 SARS-COV-2 COVID-19 AMP PRB: CPT

## 2021-09-08 PROCEDURE — 6370000000 HC RX 637 (ALT 250 FOR IP): Performed by: PSYCHIATRY & NEUROLOGY

## 2021-09-08 PROCEDURE — 82077 ASSAY SPEC XCP UR&BREATH IA: CPT

## 2021-09-08 PROCEDURE — 81003 URINALYSIS AUTO W/O SCOPE: CPT

## 2021-09-08 RX ORDER — HALOPERIDOL 5 MG
5 TABLET ORAL EVERY 6 HOURS PRN
Status: DISCONTINUED | OUTPATIENT
Start: 2021-09-08 | End: 2021-09-16 | Stop reason: HOSPADM

## 2021-09-08 RX ORDER — HYDROXYZINE HYDROCHLORIDE 50 MG/ML
50 INJECTION, SOLUTION INTRAMUSCULAR EVERY 6 HOURS PRN
Status: DISCONTINUED | OUTPATIENT
Start: 2021-09-08 | End: 2021-09-16 | Stop reason: HOSPADM

## 2021-09-08 RX ORDER — BENZTROPINE MESYLATE 1 MG/ML
2 INJECTION INTRAMUSCULAR; INTRAVENOUS 2 TIMES DAILY PRN
Status: DISCONTINUED | OUTPATIENT
Start: 2021-09-08 | End: 2021-09-16 | Stop reason: HOSPADM

## 2021-09-08 RX ORDER — LORAZEPAM 1 MG/1
1 TABLET ORAL ONCE
Status: COMPLETED | OUTPATIENT
Start: 2021-09-08 | End: 2021-09-08

## 2021-09-08 RX ORDER — MAGNESIUM HYDROXIDE/ALUMINUM HYDROXICE/SIMETHICONE 120; 1200; 1200 MG/30ML; MG/30ML; MG/30ML
30 SUSPENSION ORAL PRN
Status: DISCONTINUED | OUTPATIENT
Start: 2021-09-08 | End: 2021-09-16 | Stop reason: HOSPADM

## 2021-09-08 RX ORDER — PANTOPRAZOLE SODIUM 40 MG/1
40 TABLET, DELAYED RELEASE ORAL
Status: DISCONTINUED | OUTPATIENT
Start: 2021-09-09 | End: 2021-09-16 | Stop reason: HOSPADM

## 2021-09-08 RX ORDER — ACETAMINOPHEN 325 MG/1
650 TABLET ORAL EVERY 4 HOURS PRN
Status: DISCONTINUED | OUTPATIENT
Start: 2021-09-08 | End: 2021-09-16 | Stop reason: HOSPADM

## 2021-09-08 RX ORDER — TRAZODONE HYDROCHLORIDE 50 MG/1
50 TABLET ORAL NIGHTLY PRN
Status: DISCONTINUED | OUTPATIENT
Start: 2021-09-08 | End: 2021-09-16 | Stop reason: HOSPADM

## 2021-09-08 RX ORDER — HALOPERIDOL 5 MG/ML
5 INJECTION INTRAMUSCULAR EVERY 6 HOURS PRN
Status: DISCONTINUED | OUTPATIENT
Start: 2021-09-08 | End: 2021-09-16 | Stop reason: HOSPADM

## 2021-09-08 RX ORDER — HYDROXYZINE PAMOATE 50 MG/1
50 CAPSULE ORAL EVERY 6 HOURS PRN
Status: DISCONTINUED | OUTPATIENT
Start: 2021-09-08 | End: 2021-09-16 | Stop reason: HOSPADM

## 2021-09-08 RX ADMIN — TRAZODONE HYDROCHLORIDE 50 MG: 50 TABLET ORAL at 23:14

## 2021-09-08 RX ADMIN — HYDROXYZINE PAMOATE 50 MG: 50 CAPSULE ORAL at 23:16

## 2021-09-08 RX ADMIN — LORAZEPAM 1 MG: 1 TABLET ORAL at 15:21

## 2021-09-08 ASSESSMENT — ENCOUNTER SYMPTOMS
ABDOMINAL PAIN: 0
EYE PAIN: 0
COLOR CHANGE: 0
TROUBLE SWALLOWING: 0
ALLERGIC/IMMUNOLOGIC NEGATIVE: 1
SHORTNESS OF BREATH: 0
APNEA: 0

## 2021-09-08 NOTE — ED NOTES
Bed: BAC01  Expected date:   Expected time:   Means of arrival:   Comments:     Marly Segal RN  09/08/21 3376

## 2021-09-08 NOTE — ED NOTES
Pt is in bed area quiet and cooperative with no problems and no C/O any kind expressed.      Brayan Thompson RN  09/08/21 0408

## 2021-09-08 NOTE — FLOWSHEET NOTE
Patient denies current suicidal ideations.  Patient reports feeling safe in the [facility] AN AFFILIATE OF [facility] & verbally contracts to notify Staff if he begins to feel unsafe

## 2021-09-08 NOTE — ED PROVIDER NOTES
Diagnosis Date    Bipolar 1 disorder ([facility] [place] [address]     Bleeding acute gastric ulcer     History of self injurious behavior          SURGICAL HISTORY       Past Surgical History:   Procedure Laterality Date    UPPER GASTROINTESTINAL ENDOSCOPY N/A 8/26/2021    ESOPHAGOGASTRODUODENOSCOPY. PT. CURRENTLY IN ER performed by Donnie Black MD at [address] [facility]       Current Discharge Medication List      CONTINUE these medications which have NOT CHANGED    Details   pantoprazole (PROTONIX) 40 MG tablet Take 1 tablet by mouth 2 times daily (before meals)  Qty: 60 tablet, Refills: 2             ALLERGIES     Effexor [venlafaxine], Remeron [mirtazapine], and Seasonal    FAMILY HISTORY     History reviewed. No pertinent family history. SOCIAL HISTORY       Social History     Socioeconomic History    Marital status: Single     Spouse name: None    Number of children: None    Years of education: None    Highest education level: None   Occupational History    None   Tobacco Use    Smoking status: Current Every Day Smoker     Packs/day: 1.00     Years: 10.00     Pack years: 10.00     Types: Cigarettes    Smokeless tobacco: Former User     Types: Chew   Vaping Use    Vaping Use: Some days    Substances: Nicotine    Devices: Disposable   Substance and Sexual Activity    Alcohol use: Yes     Comment: occassional    Drug use: Yes     Types: Marijuana    Sexual activity: None   Other Topics Concern    None   Social History Narrative    None     Social Determinants of Health     Financial Resource Strain:     Difficulty of Paying Living Expenses:    Food Insecurity:     Worried About Running Out of Food in the Last Year:     Ran Out of Food in the Last Year:    Transportation Needs:     Lack of Transportation (Medical):      Lack of Transportation (Non-Medical):    Physical Activity:     Days of Exercise per Week:     Minutes of Exercise per Session:    Stress:     Feeling of Stress : Social Connections:     Frequency of Communication with Friends and Family:     Frequency of Social Gatherings with Friends and Family:     Attends Confucianism Services:     Active Member of Clubs or Organizations:     Attends Club or Organization Meetings:     Marital Status:    Intimate Partner Violence:     Fear of Current or Ex-Partner:     Emotionally Abused:     Physically Abused:     Sexually Abused:        SCREENINGS                        PHYSICAL EXAM    (up to 7 for level 4, 8 or more for level 5)     ED Triage Vitals [09/08/21 1437]   BP Temp Temp Source Pulse Resp SpO2 Height Weight   (!) 131/92 98.2 °F (36.8 °C) Oral 112 16 99 % -- --       Physical Exam  Vitals and nursing note reviewed. Constitutional:       General: He is not in acute distress. Appearance: He is well-developed. He is not diaphoretic. HENT:      Head: Normocephalic and atraumatic. Mouth/Throat:      Pharynx: No oropharyngeal exudate. Eyes:      General: No scleral icterus. Conjunctiva/sclera: Conjunctivae normal.      Pupils: Pupils are equal, round, and reactive to light. Neck:      Trachea: No tracheal deviation. Cardiovascular:      Rate and Rhythm: Normal rate. Heart sounds: Normal heart sounds. Pulmonary:      Effort: Pulmonary effort is normal. No respiratory distress. Breath sounds: Normal breath sounds. Abdominal:      General: Bowel sounds are normal. There is no distension. Palpations: Abdomen is soft. Musculoskeletal:         General: Normal range of motion. Cervical back: Normal range of motion and neck supple. Skin:     General: Skin is warm and dry. Findings: No erythema or rash. Neurological:      Mental Status: He is alert and oriented to person, place, and time. Cranial Nerves: No cranial nerve deficit. Motor: No abnormal muscle tone. Psychiatric:         Mood and Affect: Mood is anxious. Behavior: Behavior is agitated. Thought Content: Thought content normal. Thought content does not include homicidal or suicidal ideation. Judgment: Judgment is impulsive.          DIAGNOSTIC RESULTS     EKG: All EKG's are interpreted by the Emergency Department Physician who either signs or Co-signs this chart in the absence of a cardiologist.    Sinus tachycardia, rate 108 bpm, no acute ST elevation or ischemic change    RADIOLOGY:   Non-plain film images such as CT, Ultrasound and MRI are read by the radiologist. Plain radiographic images are visualized and preliminarily interpreted by the emergency physician with the below findings:        Interpretation per the Radiologist below, if available at the time of this note:    No orders to display         ED BEDSIDE ULTRASOUND:   Performed by ED Physician - none    LABS:  Labs Reviewed   ACETAMINOPHEN LEVEL - Abnormal; Notable for the following components:       Result Value    Acetaminophen Level <5 (*)     All other components within normal limits   CBC WITH AUTO DIFFERENTIAL - Abnormal; Notable for the following components:    RBC 3.24 (*)     Hemoglobin 10.0 (*)     Hematocrit 29.7 (*)     RDW 15.9 (*)     Platelets 630 (*)     All other components within normal limits   CK-MB INDEX - Abnormal; Notable for the following components:    CK-MB Index 3.9 (*)     All other components within normal limits   COMPREHENSIVE METABOLIC PANEL - Abnormal; Notable for the following components:    Anion Gap 8 (*)     CREATININE 0.65 (*)     Calcium 8.4 (*)     Total Protein 5.9 (*)     Globulin 2.2 (*)     All other components within normal limits   SALICYLATE LEVEL - Abnormal; Notable for the following components:    Salicylate, Serum <5.6 (*)     All other components within normal limits   URINE DRUG SCREEN - Abnormal; Notable for the following components:    Amphetamine Screen, Urine POSITIVE (*)     Cannabinoid Scrn, Ur POSITIVE (*)     All other components within normal limits   COVID-19, RAPID ETHANOL   LIPID PANEL   TSH WITHOUT REFLEX   URINE RT REFLEX TO CULTURE       All other labs were within normal range or not returned as of this dictation. EMERGENCY DEPARTMENT COURSE and DIFFERENTIAL DIAGNOSIS/MDM:   Vitals:    Vitals:    09/08/21 1437 09/08/21 1525   BP: (!) 131/92    Pulse: 112    Resp: 16    Temp: 98.2 °F (36.8 °C)    TempSrc: Oral    SpO2: 99%    Weight:  175 lb (79.4 kg)   Height:  5' 7.5\" (1.715 m)       Patient is medically cleared for psychiatric evaluation and care  MDM      REASSESSMENT          CONSULTS:  IP CONSULT TO HOSPITALIST  IP CONSULT TO SOCIAL WORK    PROCEDURES:  Unless otherwise noted below, none     Procedures        FINAL IMPRESSION      1. Bipolar 1 disorder, depressed ([facility] [address]          DISPOSITION/PLAN   DISPOSITION        PATIENT REFERRED TO:  No follow-up provider specified. DISCHARGE MEDICATIONS:  Current Discharge Medication List        Controlled Substances Monitoring:     No flowsheet data found.     (Please note that portions of this note were completed with a voice recognition program.  Efforts were made to edit the dictations but occasionally words are mis-transcribed.)    Romi Cuellar PA-C (electronically signed)  Attending Emergency Physician            Romi Cuellar PA-C  09/09/21 8362

## 2021-09-08 NOTE — ED NOTES
Lab @ bedside. Blood drawn. Tolerated lab draw well. Urine specimen obtained earlier & sent to lab.      Adriana Chinchilla RN  09/08/21 5550

## 2021-09-08 NOTE — ED NOTES
Awoke earlier & ate 90% dinner tray. Taking PO fluids fairly well. Resting with eyes closed & no acute distress noted.      Mason Briones RN  09/08/21 2100

## 2021-09-08 NOTE — ED NOTES
Dinner tray placed @ bedside. Resting with eyes closed & no acute distress noted.      Varun Gold RN  09/08/21 6221

## 2021-09-08 NOTE — ED NOTES
Chart to Dr. Torrey Khan earlier. Lab notified of need for blood draw.      David Begum RN  09/08/21 0836

## 2021-09-09 LAB
EKG ATRIAL RATE: 108 BPM
EKG P AXIS: 71 DEGREES
EKG P-R INTERVAL: 126 MS
EKG Q-T INTERVAL: 344 MS
EKG QRS DURATION: 86 MS
EKG QTC CALCULATION (BAZETT): 460 MS
EKG R AXIS: 76 DEGREES
EKG T AXIS: 64 DEGREES
EKG VENTRICULAR RATE: 108 BPM

## 2021-09-09 PROCEDURE — 99223 1ST HOSP IP/OBS HIGH 75: CPT | Performed by: PSYCHIATRY & NEUROLOGY

## 2021-09-09 PROCEDURE — 93010 ELECTROCARDIOGRAM REPORT: CPT | Performed by: INTERNAL MEDICINE

## 2021-09-09 PROCEDURE — 6370000000 HC RX 637 (ALT 250 FOR IP): Performed by: PSYCHIATRY & NEUROLOGY

## 2021-09-09 PROCEDURE — 1240000000 HC EMOTIONAL WELLNESS R&B

## 2021-09-09 RX ORDER — QUETIAPINE FUMARATE 50 MG/1
TABLET, EXTENDED RELEASE ORAL
Status: DISCONTINUED
Start: 2021-09-09 | End: 2021-09-10

## 2021-09-09 RX ORDER — DIVALPROEX SODIUM 250 MG/1
250 TABLET, DELAYED RELEASE ORAL EVERY 8 HOURS SCHEDULED
Status: DISCONTINUED | OUTPATIENT
Start: 2021-09-09 | End: 2021-09-16 | Stop reason: HOSPADM

## 2021-09-09 RX ORDER — QUETIAPINE FUMARATE 50 MG/1
100 TABLET, EXTENDED RELEASE ORAL NIGHTLY
Status: DISCONTINUED | OUTPATIENT
Start: 2021-09-09 | End: 2021-09-13

## 2021-09-09 RX ORDER — QUETIAPINE FUMARATE 25 MG/1
25 TABLET, FILM COATED ORAL 2 TIMES DAILY
Status: DISCONTINUED | OUTPATIENT
Start: 2021-09-09 | End: 2021-09-16 | Stop reason: HOSPADM

## 2021-09-09 RX ORDER — DIVALPROEX SODIUM 250 MG/1
TABLET, EXTENDED RELEASE ORAL
Status: DISCONTINUED
Start: 2021-09-09 | End: 2021-09-09 | Stop reason: WASHOUT

## 2021-09-09 RX ADMIN — PANTOPRAZOLE SODIUM 40 MG: 40 TABLET, DELAYED RELEASE ORAL at 17:26

## 2021-09-09 RX ADMIN — TRAZODONE HYDROCHLORIDE 50 MG: 50 TABLET ORAL at 21:26

## 2021-09-09 RX ADMIN — HALOPERIDOL 5 MG: 5 TABLET ORAL at 14:31

## 2021-09-09 RX ADMIN — DIVALPROEX SODIUM 250 MG: 250 TABLET, DELAYED RELEASE ORAL at 21:32

## 2021-09-09 RX ADMIN — HYDROXYZINE PAMOATE 50 MG: 50 CAPSULE ORAL at 13:11

## 2021-09-09 RX ADMIN — ACETAMINOPHEN 650 MG: 325 TABLET ORAL at 13:12

## 2021-09-09 RX ADMIN — PANTOPRAZOLE SODIUM 40 MG: 40 TABLET, DELAYED RELEASE ORAL at 06:33

## 2021-09-09 RX ADMIN — QUETIAPINE FUMARATE 100 MG: 50 TABLET, EXTENDED RELEASE ORAL at 21:25

## 2021-09-09 ASSESSMENT — SLEEP AND FATIGUE QUESTIONNAIRES
AVERAGE NUMBER OF SLEEP HOURS: 6
DIFFICULTY ARISING: YES
DIFFICULTY STAYING ASLEEP: YES
SLEEP PATTERN: DISTURBED/INTERRUPTED SLEEP;INSOMNIA
RESTFUL SLEEP: NO
DO YOU USE A SLEEP AID: NO
DIFFICULTY FALLING ASLEEP: YES
DO YOU HAVE DIFFICULTY SLEEPING: YES

## 2021-09-09 ASSESSMENT — PATIENT HEALTH QUESTIONNAIRE - PHQ9: SUM OF ALL RESPONSES TO PHQ QUESTIONS 1-9: 27

## 2021-09-09 ASSESSMENT — PAIN SCALES - GENERAL: PAINLEVEL_OUTOF10: 4

## 2021-09-09 NOTE — PROGRESS NOTES
Patient arrived to unit via wheelchair and was passively searched for contraband. No contraband found. Patient skin assessed by this writer and Jeri PERRY. Healed self inflicted old scaring present upper torso. No open cuts,wounds, rashes present. Skin intact.

## 2021-09-09 NOTE — PROGRESS NOTES
Pt reports anxiety, appears hyper and very anxious #8, offered and gave vistaril 50 mg and tylenol as ordered for back pain that pt states is not new # 4
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
## 2021-09-09 NOTE — PROGRESS NOTES
Pt was noted restless in bed tapping his legs , asking if doctor ordered a new med for this evening for his voices and anxiety, offered and explained and gave haldol 5mg for voices and anxiety. Pt appeared worried and fretful. r  Pt reports he has lost weight.

## 2021-09-09 NOTE — CONSULTS
[place] [address] MEDICINE    HISTORY AND PHYSICAL EXAM    PATIENT NAME:  Wilton Solano    MRN:  [id number]  SERVICE DATE:  9/9/2021   SERVICE TIME:  10:35 AM    Primary Care Physician: Shyam Younger MD         SUBJECTIVE  CHIEF COMPLAINT:  Medically appropriate for inpatient psychiatry admission. Consult for medical H/P encounter. HPI:  This is a 47 y.o. male with PMHx of bipolar disorder, self injury behavior, recent GIB (bleeding EG junction ulcer) who presented to emergency room with complaints of anxiety, depression, AH and thoughts of self injury. Labs remarkable for HGB 10, platelets 199. +THC and amphetamines  Patient cleared from emergency room for psychiatric care. Patient Seen, Chart, Labs, Radiologystudies, and Consults reviewed. Patient denies headache, chest pain, shortness of breath, N/V/D/C, fever/chills. PAST MEDICAL HISTORY:    Past Medical History:   Diagnosis Date    Bipolar 1 disorder ([place] [address]     Bleeding acute gastric ulcer     History of self injurious behavior      PAST SURGICAL HISTORY:    Past Surgical History:   Procedure Laterality Date    UPPER GASTROINTESTINAL ENDOSCOPY N/A 8/26/2021    ESOPHAGOGASTRODUODENOSCOPY. PT. CURRENTLY IN ER performed by Odin Maguire MD at [address] [place] [address]:  History reviewed. No pertinent family history.   SOCIAL HISTORY:    Social History     Socioeconomic History    Marital status: Single     Spouse name: Not on file    Number of children: Not on file    Years of education: Not on file    Highest education level: Not on file   Occupational History    Not on file   Tobacco Use    Smoking status: Current Every Day Smoker     Packs/day: 1.00     Years: 10.00     Pack years: 10.00     Types: Cigarettes    Smokeless tobacco: Former User     Types: Chew   Vaping Use    Vaping Use: Some days    Substances: Nicotine    Devices: Disposable   Substance and Sexual Activity    Alcohol use: Yes     Comment: occassional    Drug use: IntraMUSCular Q6H PRN Dayana Castanon MD        benztropine mesylate (COGENTIN) injection 2 mg  2 mg IntraMUSCular BID PRN Dayana Castanon MD        traZODone (DESYREL) tablet 50 mg  50 mg Oral Nightly PRN Dayana Castanon MD   50 mg at 09/08/21 6184       ALLERGIES: Effexor [venlafaxine], Remeron [mirtazapine], and Seasonal    REVIEW OF SYSTEM:   ROS as noted in HPI, 12 point ROS reviewed and otherwise negative. OBJECTIVE  PHYSICAL EXAM: BP (!) 131/92   Pulse 112   Temp 98.2 °F (36.8 °C) (Oral)   Resp 16   Ht 5' 7.5\" (1.715 m)   Wt 175 lb (79.4 kg)   SpO2 99%   BMI 27.00 kg/m²   CONSTITUTIONAL:  awake, alert, cooperative, no apparent distress, and appears stated age  EYES:  Lids and lashes normal, pupils equal, round and reactive to light, extra ocular muscles intact, sclera clear, conjunctiva normal  ENT:  Normocephalic, without obvious abnormality, atraumatic, sinuses nontender on palpation, external ears without lesions, oral pharynx with moist mucus membranes, tonsils without erythema or exudates, gums normal and good dentition. NECK:  Supple, symmetrical, trachea midline, no adenopathy, thyroid symmetric, not enlarged and no tenderness, skin normal  LUNGS:  No increased work of breathing, good air exchange, clear to auscultation bilaterally, no crackles or wheezing  CARDIOVASCULAR:  Normal apical impulse, regular rate and rhythm, normal S1 and S2, no S3 or S4, and no murmur noted  ABDOMEN:  No scars, normal bowel sounds, soft, non-distended, non-tender, no masses palpated, no hepatosplenomegally  MUSCULOSKELETAL:  There is no redness, warmth, or swelling of the joints. Full range of motion noted. Motor strength is 5 out of 5 all extremities bilaterally. Tone is normal.  NEUROLOGIC:  Awake, alert, oriented to name, place and time. Cranial nerves II-XII are grossly intact. Motor is 5 out of 5 bilaterally.    Sensory is intact.  gait is normal.  SKIN:  no bruising or bleeding, normal skin color, texture, turgor, no redness, warmth, or swelling and no jaundice    DATA:     Diagnostic tests reviewed for today's visit:    Most recent labs and imaging results reviewed. VTE Prophylaxis:     ASSESSMENT AND PLAN  Active Problems:    Bipolar 1 disorder, depressed ([facility] [address]  Plan: Patient admitted to behavorial health for evaluation and treatment     blood loss anemia due to GIB: resolved. Continue PPI      This is only a history and physical examination and not medical management. The patient is to contact and follow up with their primary care physician and go over any abnormal labs, imaging, findings, medical concerns, or conditions that we have and have not addressed during this encounter.     Plan of care discussed with: patient    SIGNATURE: JADE Ponce CNP  DATE: September 9, 2021  TIME: 10:35 AM

## 2021-09-09 NOTE — ED NOTES
Patient resting quietly. respirations are even and unlabored. No distress noted at this time.        Lashae Mayfield RN  09/08/21 9637
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
## 2021-09-09 NOTE — CARE COORDINATION
9/9/21 @ 9:55    Patient was laying in bed rocking bath forth, with his eyes closed during this assessment. He  became irritated and began answering \"No\" to all questions.  As a result, the assessment was deferred

## 2021-09-09 NOTE — PROGRESS NOTES
Patient reluctant to participate with admission assessment stating he was too sleepy. Patient currently denies suicidal/.homicidal ideation and verbally contracts for safety. Patient given brief orientation to unit and assigned room #372. Patient signed voluntary admission.

## 2021-09-09 NOTE — PROGRESS NOTES
Pt noted up on the unit for lunch now, pt reported this am depression #10, reports suicidal thoughts, contracts for safety on the unit, denies a plan, denies urges to cut, pt reports he will let staff know if he can not contract for safety on the unit. Pt is irritable, reports he has a lot of things going on at home, denies relapsing, reports poor sleep, isolates to his room other than out for meals. Encouraged shower and groups.

## 2021-09-09 NOTE — PROGRESS NOTES
Patient did not attend group despite staff encouragement.   Electronically signed by Gordon Umaña on 9/9/2021 at 5:09 PM

## 2021-09-09 NOTE — PROGRESS NOTES
Pt. declined to attend the 0900 community meeting, despite staff encouragement. Electronically signed by Nura Russo, [address] on 9/9/2021 at 9:45 AM

## 2021-09-09 NOTE — PROGRESS NOTES
Pt. refused to attend the 1000 skills group, despite staff encouragement. Electronically signed by Muna Welch, [address] on 9/9/2021 at 11:32 AM

## 2021-09-09 NOTE — PROGRESS NOTES
Report per Zafar Zarate. Julián Cancino is a 47 y. o. male who presents as a walk in to the ED with complaints of anxiety, depression, thoughts of self injury, and SI w/o a plan, and non-directive AH. Denies HI and VH. Patient states that his anxiety is severe. Patient seen rocking back and forth during interview and poor eyecontact.   Patient has not been on medicines for mental health in several years.   Patient denies any alcohol or drug use, however tested positive for THC and amphetamines in his urine screen.   Lives at his grandmothers house, does not work, on disability, history of cutting visual scaring to confirm seen on upper torso

## 2021-09-10 PROCEDURE — 99232 SBSQ HOSP IP/OBS MODERATE 35: CPT | Performed by: PSYCHIATRY & NEUROLOGY

## 2021-09-10 PROCEDURE — 6370000000 HC RX 637 (ALT 250 FOR IP): Performed by: PSYCHIATRY & NEUROLOGY

## 2021-09-10 PROCEDURE — 1240000000 HC EMOTIONAL WELLNESS R&B

## 2021-09-10 PROCEDURE — 93005 ELECTROCARDIOGRAM TRACING: CPT | Performed by: PSYCHIATRY & NEUROLOGY

## 2021-09-10 RX ORDER — TRAMADOL HYDROCHLORIDE 50 MG/1
100 TABLET ORAL EVERY 6 HOURS PRN
Status: COMPLETED | OUTPATIENT
Start: 2021-09-10 | End: 2021-09-11

## 2021-09-10 RX ADMIN — DIVALPROEX SODIUM 250 MG: 250 TABLET, DELAYED RELEASE ORAL at 13:49

## 2021-09-10 RX ADMIN — QUETIAPINE FUMARATE 100 MG: 50 TABLET, EXTENDED RELEASE ORAL at 21:04

## 2021-09-10 RX ADMIN — QUETIAPINE FUMARATE 25 MG: 25 TABLET ORAL at 16:23

## 2021-09-10 RX ADMIN — DIVALPROEX SODIUM 250 MG: 250 TABLET, DELAYED RELEASE ORAL at 21:05

## 2021-09-10 RX ADMIN — DIVALPROEX SODIUM 250 MG: 250 TABLET, DELAYED RELEASE ORAL at 06:23

## 2021-09-10 RX ADMIN — QUETIAPINE FUMARATE 25 MG: 25 TABLET ORAL at 09:06

## 2021-09-10 RX ADMIN — TRAMADOL HYDROCHLORIDE 100 MG: 50 TABLET, FILM COATED ORAL at 17:00

## 2021-09-10 RX ADMIN — HYDROXYZINE PAMOATE 50 MG: 50 CAPSULE ORAL at 13:49

## 2021-09-10 RX ADMIN — PANTOPRAZOLE SODIUM 40 MG: 40 TABLET, DELAYED RELEASE ORAL at 16:23

## 2021-09-10 RX ADMIN — PANTOPRAZOLE SODIUM 40 MG: 40 TABLET, DELAYED RELEASE ORAL at 06:23

## 2021-09-10 RX ADMIN — TRAMADOL HYDROCHLORIDE 100 MG: 50 TABLET, FILM COATED ORAL at 23:18

## 2021-09-10 ASSESSMENT — PAIN SCALES - GENERAL
PAINLEVEL_OUTOF10: 7
PAINLEVEL_OUTOF10: 9

## 2021-09-10 NOTE — CARE COORDINATION
9/10/21 @ 9:00am     Patient denied alcohol and drug use. He did report wanting to get suboxone treatment.  will reapproach for [facility] referral at a later time.

## 2021-09-10 NOTE — PROGRESS NOTES
Pt. declined to attend the 0900 community meeting, despite staff encouragement.  Electronically signed by Anderson Jean on 9/10/2021 at 1:31 PM

## 2021-09-10 NOTE — CARE COORDINATION
Psychosocial Assessment    Current Level of Psychosocial Functioning     Independent   Dependent  x  Minimal Assist     Comments:      Psychosocial High Risk Factors (check all that apply)    Unable to obtain meds   Chronic illness/pain    Substance abuse (denies)  Lack of Family Support x  Financial stress x  Isolation   Inadequate Community Resources  x  Suicide attempt(s)  Not taking medications   Victim of crime   Developmental Delay  Unable to manage personal needs    Age 74 or older   Homeless x  No transportation x  Readmission within 30 days  Unemployment x  Traumatic Event    Family/Supports identified:   None identified   Sexual Orientation:    Did not disclose  Patient Strengths:  Motivated for suboxone treatment  Patient Barriers:   homeless  Safety plan:  Needs completed  CMHC/MH history:    Plan of Care:  medication management, group/individual therapies, family meetings, psycho -education, treatment team meetings to assist with stabilization    Initial Discharge Plan:    Need to discuss with the team  Clinical Summary:    Patient is a 47year old male who presented to the ER with c/o anxiety, depression, and thoughts of self injury. He reports anxiety is severe. Per notes, he has not been on medication for several years. Patient was lying in bed rocking back and forth during this assessment. He kept his eyes closed during questioning. He is homeless, and is on disability. He denies alcohol and drug abuse. He did report to this writer that he wanted to get suboxone  treatment. He was becoming irritated during the assessment.  will reapproach the patient regarding [facility] services at a later time.  will assist with discharge per doctor's orders.

## 2021-09-10 NOTE — PROGRESS NOTES
Pt isolating to room. Pt denies shower or ADL's today. Pt encouraged to participate in proper hygiene practices. Pt presents with clean and well kept appearance. Pt reports good appetite. Pt reports poor sleep, due to trouble falling asleep and staying asleep. Pt rates anxiety 8/10. Pt rates depression 8/10. Pt denies attending groups. Pt denies SI, HI and visual hallucinations. Pt states he has auditory hallucinations but that they are \"better. \" No voiced delusions at this time. Pt alert and oriented x 4. Pt calm and cooperative. No thoughts to self harm. No s/s of distress noted. Will continue to monitor.

## 2021-09-10 NOTE — PROGRESS NOTES
patient isolating to room and bed. Says he is doing better. Sleeping , eating ok. Depression and anxiety #8   patient hears voices that tell him to hurt himself.   Denies  SI/HI and VH  Patient homeless at the present time   patient rocking slightly in his bed while this nurse talking to him,    Offered medication for anxiety ,   Patient refused

## 2021-09-10 NOTE — PROGRESS NOTES
Behavioral Services  Medicare Certification Upon Admission    I certify that this patient's inpatient psychiatric hospital admission is medically necessary for:    [x] (1) Treatment which could reasonably be expected to improve this patient's condition,       [x] (2) Or for diagnostic study;     AND     [x](2) The inpatient psychiatric services are provided while the individual is under the care of a physician and are included in the individualized plan of care.     Estimated length of stay/service 3-5 days    Plan for post-hospital care OP care    Electronically signed by Mc Muñoz MD on 9/10/2021 at 12:57 PM

## 2021-09-10 NOTE — PROGRESS NOTES
Pt. refused to attend the 1000 skills group, despite staff encouragement.  Electronically signed by Gabriella Guidry on 9/10/2021 at 1:31 PM

## 2021-09-10 NOTE — PROGRESS NOTES
Pt isolating to room. Pt showered today. Pt encouraged to participate in proper hygiene practices. Pt presents with clean and well kept appearance. Pt reports good appetite. Pt reports poor appetite. Pt reports poor sleep, due to trouble falling asleep and staying asleep. Pt rates anxiety 10/10. Pt rates depression 10/10. On a scale from 1 through 10, 10 being the highest. Pt denies attending groups. Pt denied SI at first and then later on in the conversaton states \"I don't even want to be alive anymore. \" States he can remain safe on the unit. No voiced delusions at this time. Pt alert and oriented x 4. Pt endorses auditory hallucinations. No s/s of distress noted. Will continue to monitor.

## 2021-09-10 NOTE — PROGRESS NOTES
Patient did not attend group despite staff encouragement.   Electronically signed by Rheba Market on 9/9/2021 at 8:17 PM

## 2021-09-10 NOTE — PROGRESS NOTES
Patient did not attend group despite staff encouragement.   Electronically signed by Andrew Shine on 9/9/2021 at 8:32 PM

## 2021-09-10 NOTE — PROGRESS NOTES
Pt came to ER for c/o depression, anxiety, negative AH, and SI with no plan. Pt has a history of self injury. Has not injured self in years. Past history of SA with most recent being a couple of years ago when he cut his wrists. Pt states he hears voices that are negative. States he is upset and has no reason for living d/t the house he was living at and doing maintenance at was sold and now he has no home. Feels hopeless/ helpless. States theres no reason to live anymore. Contracts for safety. 10/10 anxiety and depression. Isolates to room. Sleep is poor. Appetite is good. Calm and cooperative during assessment.

## 2021-09-10 NOTE — CARE COORDINATION
Brief Intervention and Referral to Treatment Summary    Patient was provided PHQ-9, AUDIT and DAST Screening:      PHQ-9 Score: 27  AUDIT Score:   0  DAST Score:    0     Patients substance use is considered     Low Risk/Healthy x  Moderate Risk  Harmful  Dependent    Patients depression is considered:     Minimal  Mild   Moderate  Moderately Severe  Severe x    Brief Education Was Provided N/A    Patient was receptive  Patient was not receptive      Brief Intervention Is Provided (Only for AUDIT or DAST) N/A    Patient reports readiness to decrease and/or stop use and a plan was discussed   Patient denies readiness to decrease and/or stop use and a plan was not discussed      Recommendations/Referrals for Brief and/or Specialized Treatment Provided to Patient   Patient denied alcohol and drug use. He did report wanting to get suboxone treatment.  will re approach for [facility] referral at a later time.

## 2021-09-10 NOTE — PROGRESS NOTES
Halie Nguyen \[facility]\"" 89. FOLLOW-UP NOTE       9/10/2021     Patient was seen and examined in person, Chart reviewed   Patient's case discussed with staff/team    Chief Complaint: Depression SI    Interim History:     Pt report feeling depressed and suicidal  Secluding in his room  AH ++ unclear content  Hopeless and worthless feeling  Poor self care and hygiene  Pt report using Suboxone from the street few days ago and is going through w/d  Denies paranoid thoughts  Appetite:   [] Normal/Unchanged  [] Increased  [x] Decreased      Sleep:       [] Normal/Unchanged  [] Fair       [x] Poor              Energy:    [] Normal/Unchanged  [] Increased  [x] Decreased        SI [x] Present  [] Absent    HI  []Present  [x] Absent     Aggression:  [] yes  [x] no    Patient is [] able  [x] unable to CONTRACT FOR SAFETY     PAST MEDICAL/PSYCHIATRIC HISTORY:   Past Medical History:   Diagnosis Date    Bipolar 1 disorder ([facility] [address]     Bleeding acute gastric ulcer     History of self injurious behavior        FAMILY/SOCIAL HISTORY:  History reviewed. No pertinent family history.   Social History     Socioeconomic History    Marital status: Single     Spouse name: Not on file    Number of children: Not on file    Years of education: Not on file    Highest education level: Not on file   Occupational History    Not on file   Tobacco Use    Smoking status: Current Every Day Smoker     Packs/day: 1.00     Years: 10.00     Pack years: 10.00     Types: Cigarettes    Smokeless tobacco: Former User     Types: Chew   Vaping Use    Vaping Use: Some days    Substances: Nicotine    Devices: Disposable   Substance and Sexual Activity    Alcohol use: Yes     Comment: occassional    Drug use: Yes     Types: Marijuana    Sexual activity: Not on file   Other Topics Concern    Not on file   Social History Narrative    Not on file     Social Determinants of Health     Financial Resource Strain:     Difficulty of Paying Living Expenses:    Food Insecurity:     Worried About Running Out of Food in the Last Year:     [address] in the Last Year:    Transportation Needs:     Lack of Transportation (Medical):  Lack of Transportation (Non-Medical):    Physical Activity:     Days of Exercise per Week:     Minutes of Exercise per Session:    Stress:     Feeling of Stress :    Social Connections:     Frequency of Communication with Friends and Family:     Frequency of Social Gatherings with Friends and Family:     Attends [employer] Services:     Active Member of Clubs or Organizations:     Attends Club or Organization Meetings:     Marital Status:    Intimate Partner Violence:     Fear of Current or Ex-Partner:     Emotionally Abused:     Physically Abused:     Sexually Abused:            ROS:  [x] All negative/unchanged except if checked.  Explain positive(checked items) below:  [] Constitutional  [] Eyes  [] Ear/Nose/Mouth/Throat  [] Respiratory  [] CV  [] GI  []   [] Musculoskeletal  [] Skin/Breast  [] Neurological  [] Endocrine  [] Heme/Lymph  [] Allergic/Immunologic    Explanation:     MEDICATIONS:    Current Facility-Administered Medications:     divalproex (DEPAKOTE) DR tablet 250 mg, 250 mg, Oral, 3 times per day, Charo Boyce MD, 250 mg at 09/10/21 0623    QUEtiapine (SEROQUEL) tablet 25 mg, 25 mg, Oral, BID, Charo Boyce MD, 25 mg at 09/10/21 0906    QUEtiapine (SEROQUEL XR) extended release tablet 100 mg, 100 mg, Oral, Nightly, Charo Boyce MD, 100 mg at 09/09/21 2125    pantoprazole (PROTONIX) tablet 40 mg, 40 mg, Oral, BID AC, Ana Bales MD, 40 mg at 09/10/21 7952    hydrOXYzine (VISTARIL) capsule 50 mg, 50 mg, Oral, Q6H PRN, 50 mg at 09/09/21 1311 **OR** hydrOXYzine (VISTARIL) injection 50 mg, 50 mg, IntraMUSCular, Q6H PRN, Ana Bales MD    acetaminophen (TYLENOL) tablet 650 mg, 650 mg, Oral, Q4H PRN, Ana Bales MD, 650 mg at 09/09/21 1312   magnesium hydroxide (MILK OF MAGNESIA) 400 MG/5ML suspension 30 mL, 30 mL, Oral, Daily PRN, Dayana Castanon MD    aluminum & magnesium hydroxide-simethicone (MAALOX) 200-200-20 MG/5ML suspension 30 mL, 30 mL, Oral, PRN, Dayana Castanon MD    haloperidol (HALDOL) tablet 5 mg, 5 mg, Oral, Q6H PRN, 5 mg at 09/09/21 1431 **OR** haloperidol lactate (HALDOL) injection 5 mg, 5 mg, IntraMUSCular, Q6H PRN, Dayana Castanon MD    benztropine mesylate (COGENTIN) injection 2 mg, 2 mg, IntraMUSCular, BID PRN, Dayana Castanon MD    traZODone (DESYREL) tablet 50 mg, 50 mg, Oral, Nightly PRN, Dayana Castanon MD, 50 mg at 09/09/21 2126      Examination:  BP (!) 120/92   Pulse 107   Temp 97.5 °F (36.4 °C) (Oral)   Resp 18   Ht 5' 7.5\" (1.715 m)   Wt 175 lb (79.4 kg)   SpO2 100%   BMI 27.00 kg/m²   Gait - steady  Medication side effects(SE): no    Mental Status Examination:    Level of consciousness:  within normal limits   Appearance:  poor grooming and poor hygiene  Behavior/Motor:  psychomotor retardation  Attitude toward examiner:  guarded  Speech:  slow   Mood: depressed and dysthymic  Affect:  blunted  Thought processes:  slow   Thought content:  Suicidal Ideation:  passive  Cognition:  oriented to person, place, and time   Concentration poor  Insight poor   Judgement poor     ASSESSMENT:   Patient symptoms are:  [] Well controlled  [] Improving  [] Worsening  [] No change      Diagnosis:   Active Problems:    Bipolar 1 disorder, depressed ([facility] [address]  Resolved Problems:    * No resolved hospital problems.  *  Polydrug abuse    LABS:    Recent Labs     09/08/21  1506   WBC 6.0   HGB 10.0*   *     Recent Labs     09/08/21  1506      K 4.3      CO2 24   BUN 8   CREATININE 0.65*   GLUCOSE 99     Recent Labs     09/08/21  1506   BILITOT 0.3   ALKPHOS 72   AST 15   ALT 21     Lab Results   Component Value Date    LABAMPH POSITIVE 09/08/2021    BARBSCNU Neg 09/08/2021    LABBENZ Neg 09/08/2021 LABBENZ NotDTCD 01/05/2013    LABMETH Neg 09/08/2021    OPIATESCREENURINE Neg 09/08/2021    PHENCYCLIDINESCREENURINE Neg 09/08/2021    ETOH <10 09/08/2021     Lab Results   Component Value Date    TSH 0.775 09/08/2021     Lab Results   Component Value Date    LITHIUM <0.1 (L) 07/07/2015     Lab Results   Component Value Date    VALPROATE 55.8 09/21/2016         Treatment Plan:  Reviewed current Medications with the patient. Medication as ordered  Risks, benefits, side effects, drug-to-drug interactions and alternatives to treatment were discussed. Collateral information:   CD evaluation  Encourage patient to attend group and other milieu activities.   Discharge planning discussed with the patient and treatment team.    PSYCHOTHERAPY/COUNSELING:  [x] Therapeutic interview  [x] Supportive  [] CBT  [] Ongoing  [] Other    [x] Patient continues to need, on a daily basis, active treatment furnished directly by or requiring the supervision of inpatient psychiatric personnel      Anticipated Length of stay:            Electronically signed by Taco Warner MD on 9/10/2021 at 1:02 PM

## 2021-09-11 PROCEDURE — 6370000000 HC RX 637 (ALT 250 FOR IP): Performed by: PSYCHIATRY & NEUROLOGY

## 2021-09-11 PROCEDURE — 1240000000 HC EMOTIONAL WELLNESS R&B

## 2021-09-11 RX ADMIN — QUETIAPINE FUMARATE 100 MG: 50 TABLET, EXTENDED RELEASE ORAL at 21:25

## 2021-09-11 RX ADMIN — QUETIAPINE FUMARATE 25 MG: 25 TABLET ORAL at 16:20

## 2021-09-11 RX ADMIN — DIVALPROEX SODIUM 250 MG: 250 TABLET, DELAYED RELEASE ORAL at 13:59

## 2021-09-11 RX ADMIN — DIVALPROEX SODIUM 250 MG: 250 TABLET, DELAYED RELEASE ORAL at 06:12

## 2021-09-11 RX ADMIN — PANTOPRAZOLE SODIUM 40 MG: 40 TABLET, DELAYED RELEASE ORAL at 06:12

## 2021-09-11 RX ADMIN — TRAMADOL HYDROCHLORIDE 100 MG: 50 TABLET, FILM COATED ORAL at 10:19

## 2021-09-11 RX ADMIN — PANTOPRAZOLE SODIUM 40 MG: 40 TABLET, DELAYED RELEASE ORAL at 16:20

## 2021-09-11 RX ADMIN — DIVALPROEX SODIUM 250 MG: 250 TABLET, DELAYED RELEASE ORAL at 21:30

## 2021-09-11 RX ADMIN — TRAMADOL HYDROCHLORIDE 100 MG: 50 TABLET, FILM COATED ORAL at 16:20

## 2021-09-11 RX ADMIN — QUETIAPINE FUMARATE 25 MG: 25 TABLET ORAL at 09:42

## 2021-09-11 ASSESSMENT — PAIN SCALES - GENERAL
PAINLEVEL_OUTOF10: 7
PAINLEVEL_OUTOF10: 8
PAINLEVEL_OUTOF10: 0

## 2021-09-11 NOTE — PROGRESS NOTES
Pt is noted resting in his room all am, reported depression is little better #8, anxiety #7, reports suicidal thoughts, reports he is tired of this, contracted for safety on the unit, pt report Seroquel is helpful for the anxiety, requested ultram for not getting suboxon, pt reports he will not be getting the suboxen when discharged. Pt reports voices at times ,had them this am, denied them now.

## 2021-09-11 NOTE — PROGRESS NOTES
Pt reports his room feels like a thong.  Gave 2pm  Depakote, pt has been sleeping most of shift  , up for meals

## 2021-09-11 NOTE — PROGRESS NOTES
Pt. declined to attend the 0900 community meeting, despite staff encouragement.  Electronically signed by Arnol Juarez on 9/11/2021 at 10:05 AM

## 2021-09-11 NOTE — PROGRESS NOTES
Patient did not attend group despite staff encouragement.   Electronically signed by Yvette Roberts on 9/11/2021 at 6:23 PM

## 2021-09-11 NOTE — PROGRESS NOTES
Pt. refused to attend the 1000 skills group, despite staff encouragement.  Electronically signed by Vasile Liang on 9/11/2021 at 1:18 PM

## 2021-09-11 NOTE — PROGRESS NOTES
Pt reports the ultram was helpful, aware of 2pm Depakote and 4pm Seroquel , refused offer for vistaril.

## 2021-09-11 NOTE — PROGRESS NOTES
Patient did not attend group despite staff encouragement.   Electronically signed by Fouzia Romero on 9/11/2021 at 4:31 PM

## 2021-09-11 NOTE — PROGRESS NOTES
Patient did not attend group despite staff encouragement.   Electronically signed by Madelaine Barakat on 9/10/2021 at 9:39 PM

## 2021-09-12 PROCEDURE — 6370000000 HC RX 637 (ALT 250 FOR IP): Performed by: PSYCHIATRY & NEUROLOGY

## 2021-09-12 PROCEDURE — 1240000000 HC EMOTIONAL WELLNESS R&B

## 2021-09-12 RX ADMIN — DIVALPROEX SODIUM 250 MG: 250 TABLET, DELAYED RELEASE ORAL at 21:41

## 2021-09-12 RX ADMIN — DIVALPROEX SODIUM 250 MG: 250 TABLET, DELAYED RELEASE ORAL at 13:49

## 2021-09-12 RX ADMIN — ACETAMINOPHEN 650 MG: 325 TABLET ORAL at 18:54

## 2021-09-12 RX ADMIN — PANTOPRAZOLE SODIUM 40 MG: 40 TABLET, DELAYED RELEASE ORAL at 06:27

## 2021-09-12 RX ADMIN — ACETAMINOPHEN 650 MG: 325 TABLET ORAL at 23:19

## 2021-09-12 RX ADMIN — PANTOPRAZOLE SODIUM 40 MG: 40 TABLET, DELAYED RELEASE ORAL at 16:52

## 2021-09-12 RX ADMIN — QUETIAPINE FUMARATE 25 MG: 25 TABLET ORAL at 16:52

## 2021-09-12 RX ADMIN — QUETIAPINE FUMARATE 100 MG: 50 TABLET, EXTENDED RELEASE ORAL at 21:41

## 2021-09-12 RX ADMIN — HYDROXYZINE PAMOATE 50 MG: 50 CAPSULE ORAL at 18:53

## 2021-09-12 RX ADMIN — DIVALPROEX SODIUM 250 MG: 250 TABLET, DELAYED RELEASE ORAL at 06:27

## 2021-09-12 RX ADMIN — QUETIAPINE FUMARATE 25 MG: 25 TABLET ORAL at 09:03

## 2021-09-12 RX ADMIN — HALOPERIDOL 5 MG: 5 TABLET ORAL at 23:19

## 2021-09-12 ASSESSMENT — PAIN SCALES - GENERAL
PAINLEVEL_OUTOF10: 5
PAINLEVEL_OUTOF10: 7

## 2021-09-12 NOTE — PROGRESS NOTES
Patient did not attend group despite staff encouragement.   Electronically signed by Mark Duncan on 9/11/2021 at 10:15 PM

## 2021-09-12 NOTE — PROGRESS NOTES
Pt reports being on Neurontin 600 mg tid for musa foot bone spurs, pt reports he had a rx in [place] with refills [place], thinks he has refills Pt is asking to take it. Informed rn.

## 2021-09-12 NOTE — PROGRESS NOTES
Vistaril given for #7 anxiety   Tylenol given for #7 bilateral feet pain at [address] [place] [address]

## 2021-09-12 NOTE — PROGRESS NOTES
Pt has been resting most of day in bed , noted to be out for meals, appeared brighter, more alert, calmer  this at 9 am med pass , pt took Seroquel 25 mg , reports it helps for anxiety. Reports depression is #8 anxiety #7, denied voices or suicidal thoughts, thoughts of cutting,   Pt is watching tv now.

## 2021-09-12 NOTE — PROGRESS NOTES
Department of Psychiatry  Attending Progress Note      SUBJECTIVE:  This is a 46 yo male admitted for severe anxiety and suicidal ideations in a context of nonadherence to medicationss and relapse on drugs. Reports feeling anxious, worried where he will go, states withdrawal is a little better. patient is worried where he will go next does not feel   OBJECTIVE  Patient is anxious, isolates not partcipating in groups  Physical  VITALS:  BP (!) 108/93   Pulse 124   Temp 98.2 °F (36.8 °C) (Oral)   Resp 18   Ht 5' 7.5\" (1.715 m)   Wt 175 lb (79.4 kg)   SpO2 99%   BMI 27.00 kg/m²   CONSTITUTIONAL:  awake, alert, cooperative, no apparent distress, and appears stated age  Mental Status Examination:  Level of consciousness:  within normal limits  Appearance:  ill-appearing  Behavior/Motor:  psychomotor retardation  Attitude toward examiner:  evasive, guarded and withdrawn  Speech:  slow, whispered, increased latency and low productivity  Mood:  Depressed/anxious  Affect:  mood congruent  Thought processes:  flight of ideas  Thought content:  Preoccupied with anxiety  Homocidal ideation denies  Suicidal Ideation:  Passive suicidal ideation  Delusions:  paranoid  Perceptual Disturbance:  denies any perceptual disturbance  Cognition:  Memory impaired immediate recall  Insight:  poor  Judgment:  poor  Impulse control poor  Protective factors: none    Data  Labs:    CBC with Differential:    Lab Results   Component Value Date    WBC 6.0 09/08/2021    RBC 3.24 09/08/2021    RBC 4.92 12/31/2011    HGB 10.0 09/08/2021    HCT 29.7 09/08/2021     09/08/2021    MCV 91.5 09/08/2021    MCH 30.8 09/08/2021    MCHC 33.6 09/08/2021    RDW 15.9 09/08/2021    LYMPHOPCT 22.1 09/08/2021    MONOPCT 11.5 09/08/2021    EOSPCT 0.7 12/31/2011    BASOPCT 0.7 09/08/2021    MONOSABS 0.7 09/08/2021    LYMPHSABS 1.3 09/08/2021    EOSABS 0.2 09/08/2021    BASOSABS 0.0 09/08/2021     CMP:    Lab Results   Component Value Date     09/08/2021    K 4.3 09/08/2021     09/08/2021    CO2 24 09/08/2021    BUN 8 09/08/2021    CREATININE 0.65 09/08/2021    GFRAA >60.0 09/08/2021    LABGLOM >60.0 09/08/2021    GLUCOSE 99 09/08/2021    GLUCOSE 97 12/31/2011    PROT 5.9 09/08/2021    LABALBU 3.7 09/08/2021    LABALBU 4.7 12/31/2011    CALCIUM 8.4 09/08/2021    BILITOT 0.3 09/08/2021    ALKPHOS 72 09/08/2021    AST 15 09/08/2021    ALT 21 09/08/2021     Hepatic Function Panel:    Lab Results   Component Value Date    ALKPHOS 72 09/08/2021    ALT 21 09/08/2021    AST 15 09/08/2021    PROT 5.9 09/08/2021    BILITOT 0.3 09/08/2021    BILIDIR 0.0 09/28/2015    IBILI 0.4 09/28/2015    LABALBU 3.7 09/08/2021    LABALBU 4.7 12/31/2011       Medications  Current Facility-Administered Medications: divalproex (DEPAKOTE) DR tablet 250 mg, 250 mg, Oral, 3 times per day  QUEtiapine (SEROQUEL) tablet 25 mg, 25 mg, Oral, BID  QUEtiapine (SEROQUEL XR) extended release tablet 100 mg, 100 mg, Oral, Nightly  pantoprazole (PROTONIX) tablet 40 mg, 40 mg, Oral, BID AC  hydrOXYzine (VISTARIL) capsule 50 mg, 50 mg, Oral, Q6H PRN **OR** hydrOXYzine (VISTARIL) injection 50 mg, 50 mg, IntraMUSCular, Q6H PRN  acetaminophen (TYLENOL) tablet 650 mg, 650 mg, Oral, Q4H PRN  magnesium hydroxide (MILK OF MAGNESIA) 400 MG/5ML suspension 30 mL, 30 mL, Oral, Daily PRN  aluminum & magnesium hydroxide-simethicone (MAALOX) 200-200-20 MG/5ML suspension 30 mL, 30 mL, Oral, PRN  haloperidol (HALDOL) tablet 5 mg, 5 mg, Oral, Q6H PRN **OR** haloperidol lactate (HALDOL) injection 5 mg, 5 mg, IntraMUSCular, Q6H PRN  benztropine mesylate (COGENTIN) injection 2 mg, 2 mg, IntraMUSCular, BID PRN  traZODone (DESYREL) tablet 50 mg, 50 mg, Oral, Nightly PRN    ASSESSMENT AND PLAN    Dx: bipolar depressed  No change at this time. Nelli Mendez MD

## 2021-09-12 NOTE — GROUP NOTE
Group Therapy Note    Date: 9/12/2021    Group Start Time: 1915  Group End Time: 1945  Group Topic: Recreational    [id number]    Juancho Gilman        Group Therapy Note    Attendees: 6         Patient's Goal:  Attend goup    Notes:  Good participation    Status After Intervention:  Unchanged    Participation Level:  Active Listener and Interactive    Participation Quality: Appropriate, Attentive, Sharing and Supportive      Speech:  normal      Thought Process/Content: Logical      Affective Functioning: Congruent      Mood: euthymic      Level of consciousness:  Alert, Oriented x4 and Attentive      Response to Learning: Progressing to goal      Endings: None Reported    Modes of Intervention: Socialization      Discipline Responsible: Behavorial Health Tech      Signature:  Juancho Gilman

## 2021-09-12 NOTE — BH NOTE
Patient appears depressed and lost in his thoughts. He is isolative but comes out for meals and special requests. He has an irritable edge. He C/O pain/discomfort primarily in feet because of bone spurs. Denies current voices, SI, or HI.

## 2021-09-12 NOTE — CARE COORDINATION
Group Therapy Note    Date: 9/12/2021  Start Time: 1100  End Time:  1130    Number of Participants: 4    Type of Group: Psychotherapy    Patient's Goal: To participate in a goal oriented group. Notes: Patient declined to attend psychoeducation group at 1100despite encouragement by staff.      Discipline Responsible: /Counselor    RONEL Kearney

## 2021-09-12 NOTE — PROGRESS NOTES
Patient has had a sullen appearance most of the shift. He has been spending a great deal of time in his room sleeping. He said his depression is a bit worse today. Rates both anxiety and depression as a 7 or 8. He said he does not have thoughts of SI or current thoughts of hurting himself. No HI or AVH.

## 2021-09-12 NOTE — PROGRESS NOTES
Patient did not attend group despite staff encouragement.   Electronically signed by Rehan Jean-Baptiste on 9/11/2021 at 9:47 PM

## 2021-09-12 NOTE — PROGRESS NOTES
Pt. declined to attend the 0900 community meeting, despite staff encouragement.  Electronically signed by Marlyn Parry on 9/12/2021 at 9:34 AM

## 2021-09-13 ENCOUNTER — APPOINTMENT (OUTPATIENT)
Dept: GENERAL RADIOLOGY | Age: 41
DRG: 885 | End: 2021-09-13
Payer: COMMERCIAL

## 2021-09-13 PROBLEM — G60.9 IDIOPATHIC NEUROPATHY: Status: ACTIVE | Noted: 2021-09-13

## 2021-09-13 PROBLEM — M54.50 CHRONIC BILATERAL LOW BACK PAIN WITHOUT SCIATICA: Status: ACTIVE | Noted: 2021-09-13

## 2021-09-13 PROBLEM — M79.10 MUSCLE SORENESS: Status: ACTIVE | Noted: 2021-09-13

## 2021-09-13 PROBLEM — M21.40: Status: ACTIVE | Noted: 2021-09-13

## 2021-09-13 PROBLEM — F19.94 SUBSTANCE INDUCED MOOD DISORDER (HCC): Status: ACTIVE | Noted: 2021-09-13

## 2021-09-13 PROBLEM — F12.20 DELTA-9-TETRAHYDROCANNABINOL (THC) DEPENDENCE (HCC): Status: ACTIVE | Noted: 2021-09-13

## 2021-09-13 PROBLEM — K27.9 PEPTIC ULCER DISEASE: Status: ACTIVE | Noted: 2021-09-13

## 2021-09-13 PROBLEM — M79.605 PAINFUL LEGS AND MOVING TOES: Status: ACTIVE | Noted: 2021-09-13

## 2021-09-13 PROBLEM — M79.604 PAINFUL LEGS AND MOVING TOES: Status: ACTIVE | Noted: 2021-09-13

## 2021-09-13 PROBLEM — M54.40 LUMBAGO WITH SCIATICA: Status: ACTIVE | Noted: 2021-09-13

## 2021-09-13 PROBLEM — G89.29 CHRONIC BILATERAL LOW BACK PAIN WITHOUT SCIATICA: Status: ACTIVE | Noted: 2021-09-13

## 2021-09-13 PROBLEM — F15.10 AMPHETAMINE ABUSE (HCC): Status: ACTIVE | Noted: 2021-09-13

## 2021-09-13 LAB
EKG ATRIAL RATE: 101 BPM
EKG P AXIS: 72 DEGREES
EKG P-R INTERVAL: 124 MS
EKG Q-T INTERVAL: 342 MS
EKG QRS DURATION: 88 MS
EKG QTC CALCULATION (BAZETT): 443 MS
EKG R AXIS: 68 DEGREES
EKG T AXIS: 61 DEGREES
EKG VENTRICULAR RATE: 101 BPM

## 2021-09-13 PROCEDURE — 99232 SBSQ HOSP IP/OBS MODERATE 35: CPT | Performed by: PSYCHIATRY & NEUROLOGY

## 2021-09-13 PROCEDURE — 6370000000 HC RX 637 (ALT 250 FOR IP): Performed by: PSYCHIATRY & NEUROLOGY

## 2021-09-13 PROCEDURE — 1240000000 HC EMOTIONAL WELLNESS R&B

## 2021-09-13 PROCEDURE — 73630 X-RAY EXAM OF FOOT: CPT

## 2021-09-13 PROCEDURE — 93010 ELECTROCARDIOGRAM REPORT: CPT | Performed by: INTERNAL MEDICINE

## 2021-09-13 PROCEDURE — 6370000000 HC RX 637 (ALT 250 FOR IP): Performed by: ANESTHESIOLOGY

## 2021-09-13 PROCEDURE — 72110 X-RAY EXAM L-2 SPINE 4/>VWS: CPT

## 2021-09-13 RX ORDER — GABAPENTIN 300 MG/1
300 CAPSULE ORAL 3 TIMES DAILY
Status: DISCONTINUED | OUTPATIENT
Start: 2021-09-13 | End: 2021-09-16 | Stop reason: HOSPADM

## 2021-09-13 RX ORDER — GABAPENTIN 300 MG/1
300 CAPSULE ORAL 2 TIMES DAILY
Qty: 60 CAPSULE | Refills: 0 | Status: SHIPPED | OUTPATIENT
Start: 2021-09-13 | End: 2021-10-13

## 2021-09-13 RX ORDER — ATOMOXETINE 25 MG/1
25 CAPSULE ORAL DAILY
Status: DISCONTINUED | OUTPATIENT
Start: 2021-09-14 | End: 2021-09-16 | Stop reason: HOSPADM

## 2021-09-13 RX ORDER — LIDOCAINE 40 MG/G
CREAM TOPICAL
Qty: 2 EACH | Refills: 0 | Status: SHIPPED | OUTPATIENT
Start: 2021-09-13

## 2021-09-13 RX ORDER — NICOTINE 21 MG/24HR
1 PATCH, TRANSDERMAL 24 HOURS TRANSDERMAL DAILY
Status: DISCONTINUED | OUTPATIENT
Start: 2021-09-13 | End: 2021-09-16 | Stop reason: HOSPADM

## 2021-09-13 RX ORDER — QUETIAPINE FUMARATE 50 MG/1
150 TABLET, EXTENDED RELEASE ORAL NIGHTLY
Status: DISCONTINUED | OUTPATIENT
Start: 2021-09-13 | End: 2021-09-16 | Stop reason: HOSPADM

## 2021-09-13 RX ORDER — CAPSAICIN 0.025 %
CREAM (GRAM) TOPICAL 2 TIMES DAILY
Status: DISCONTINUED | OUTPATIENT
Start: 2021-09-13 | End: 2021-09-16 | Stop reason: HOSPADM

## 2021-09-13 RX ORDER — CAPSAICIN 0.025 %
CREAM (GRAM) TOPICAL
Qty: 2 EACH | Refills: 5 | Status: SHIPPED | OUTPATIENT
Start: 2021-09-13 | End: 2021-10-13

## 2021-09-13 RX ORDER — LIDOCAINE 40 MG/G
CREAM TOPICAL 4 TIMES DAILY
Status: DISCONTINUED | OUTPATIENT
Start: 2021-09-13 | End: 2021-09-16 | Stop reason: HOSPADM

## 2021-09-13 RX ADMIN — CAPSAICIN: 0.25 CREAM TOPICAL at 21:29

## 2021-09-13 RX ADMIN — DIVALPROEX SODIUM 250 MG: 250 TABLET, DELAYED RELEASE ORAL at 06:28

## 2021-09-13 RX ADMIN — GABAPENTIN 300 MG: 300 CAPSULE ORAL at 21:32

## 2021-09-13 RX ADMIN — ACETAMINOPHEN 650 MG: 325 TABLET ORAL at 15:42

## 2021-09-13 RX ADMIN — QUETIAPINE FUMARATE 25 MG: 25 TABLET ORAL at 09:58

## 2021-09-13 RX ADMIN — PANTOPRAZOLE SODIUM 40 MG: 40 TABLET, DELAYED RELEASE ORAL at 06:28

## 2021-09-13 RX ADMIN — DIVALPROEX SODIUM 250 MG: 250 TABLET, DELAYED RELEASE ORAL at 21:32

## 2021-09-13 RX ADMIN — TRAZODONE HYDROCHLORIDE 50 MG: 50 TABLET ORAL at 01:47

## 2021-09-13 RX ADMIN — LIDOCAINE: 4 CREAM TOPICAL at 21:29

## 2021-09-13 RX ADMIN — QUETIAPINE FUMARATE 150 MG: 50 TABLET, EXTENDED RELEASE ORAL at 21:31

## 2021-09-13 RX ADMIN — PANTOPRAZOLE SODIUM 40 MG: 40 TABLET, DELAYED RELEASE ORAL at 15:42

## 2021-09-13 RX ADMIN — DIVALPROEX SODIUM 250 MG: 250 TABLET, DELAYED RELEASE ORAL at 14:10

## 2021-09-13 RX ADMIN — QUETIAPINE FUMARATE 25 MG: 25 TABLET ORAL at 15:42

## 2021-09-13 ASSESSMENT — PAIN SCALES - GENERAL
PAINLEVEL_OUTOF10: 7
PAINLEVEL_OUTOF10: 3

## 2021-09-13 ASSESSMENT — ENCOUNTER SYMPTOMS
GASTROINTESTINAL NEGATIVE: 1
BACK PAIN: 1
ALLERGIC/IMMUNOLOGIC NEGATIVE: 1
RESPIRATORY NEGATIVE: 1
EYES NEGATIVE: 1

## 2021-09-13 NOTE — PROGRESS NOTES
[facility] FOLLOW-UP NOTE       9/13/2021     Patient was seen and examined in person, Chart reviewed   Patient's case discussed with staff/team    Chief Complaint: Depression    Interim History:     Pt report feeling better  Less depressed  Want to go to rehab   Has been secluding in his room  No agitation  Feel less anxious   Want ADD med started  Appetite:   [x] Normal/Unchanged  [] Increased  [] Decreased      Sleep:       [] Normal/Unchanged  [x] Fair       [] Poor              Energy:    [] Normal/Unchanged  [] Increased  [x] Decreased        SI [] Present  [x] Absent    HI  []Present  [x] Absent     Aggression:  [] yes  [x] no    Patient is [x] able  [] unable to CONTRACT FOR SAFETY     PAST MEDICAL/PSYCHIATRIC HISTORY:   Past Medical History:   Diagnosis Date    Bipolar 1 disorder ([facility] [address]     Bleeding acute gastric ulcer     History of self injurious behavior        FAMILY/SOCIAL HISTORY:  History reviewed. No pertinent family history.   Social History     Socioeconomic History    Marital status: Single     Spouse name: Not on file    Number of children: Not on file    Years of education: Not on file    Highest education level: Not on file   Occupational History    Not on file   Tobacco Use    Smoking status: Current Every Day Smoker     Packs/day: 1.00     Years: 10.00     Pack years: 10.00     Types: Cigarettes    Smokeless tobacco: Former User     Types: Chew   Vaping Use    Vaping Use: Some days    Substances: Nicotine    Devices: Disposable   Substance and Sexual Activity    Alcohol use: Yes     Comment: occassional    Drug use: Yes     Types: Marijuana    Sexual activity: Not on file   Other Topics Concern    Not on file   Social History Narrative    Not on file     Social Determinants of Health     Financial Resource Strain:     Difficulty of Paying Living Expenses:    Food Insecurity:     Worried About [address] [employer] in the Last Year:     Ran Out of Food in the Last Year:    Transportation Needs:     Lack of Transportation (Medical):  Lack of Transportation (Non-Medical):    Physical Activity:     Days of Exercise per Week:     Minutes of Exercise per Session:    Stress:     Feeling of Stress :    Social Connections:     Frequency of Communication with Friends and Family:     Frequency of Social Gatherings with Friends and Family:     Attends Spiritism Services:     Active Member of Clubs or Organizations:     Attends Club or Organization Meetings:     Marital Status:    Intimate Partner Violence:     Fear of Current or Ex-Partner:     Emotionally Abused:     Physically Abused:     Sexually Abused:            ROS:  [x] All negative/unchanged except if checked.  Explain positive(checked items) below:  [] Constitutional  [] Eyes  [] Ear/Nose/Mouth/Throat  [] Respiratory  [] CV  [] GI  []   [] Musculoskeletal  [] Skin/Breast  [] Neurological  [] Endocrine  [] Heme/Lymph  [] Allergic/Immunologic    Explanation:     MEDICATIONS:    Current Facility-Administered Medications:     QUEtiapine (SEROQUEL XR) extended release tablet 150 mg, 150 mg, Oral, Nightly, Keyur Puga MD    [START ON 9/14/2021] atomoxetine (STRATTERA) capsule 25 mg, 25 mg, Oral, Daily, Miesha Benavides MD    divalproex (DEPAKOTE) DR tablet 250 mg, 250 mg, Oral, 3 times per day, Miesha Benavides MD, 250 mg at 09/13/21 1410    QUEtiapine (SEROQUEL) tablet 25 mg, 25 mg, Oral, BID, Miesha Benavides MD, 25 mg at 09/13/21 1542    pantoprazole (PROTONIX) tablet 40 mg, 40 mg, Oral, BID AC, Leopold Garner, MD, 40 mg at 09/13/21 1542    hydrOXYzine (VISTARIL) capsule 50 mg, 50 mg, Oral, Q6H PRN, 50 mg at 09/12/21 1853 **OR** hydrOXYzine (VISTARIL) injection 50 mg, 50 mg, IntraMUSCular, Q6H PRN, Leopold Garner, MD    acetaminophen (TYLENOL) tablet 650 mg, 650 mg, Oral, Q4H PRN, Leopold Garner, MD, 650 mg at 09/13/21 1542    magnesium hydroxide (MILK OF MAGNESIA) 400 MG/5ML suspension 30 mL, 30 mL, Oral, Daily PRN, Donny Vines MD    aluminum & magnesium hydroxide-simethicone (MAALOX) 200-200-20 MG/5ML suspension 30 mL, 30 mL, Oral, PRN, Donny Vines MD    haloperidol (HALDOL) tablet 5 mg, 5 mg, Oral, Q6H PRN, 5 mg at 09/12/21 1014 **OR** haloperidol lactate (HALDOL) injection 5 mg, 5 mg, IntraMUSCular, Q6H PRN, Donny Vines MD    benztropine mesylate (COGENTIN) injection 2 mg, 2 mg, IntraMUSCular, BID PRN, Donny Vines MD    traZODone (DESYREL) tablet 50 mg, 50 mg, Oral, Nightly PRN, Donny Vines MD, 50 mg at 09/13/21 0147      Examination:  /75   Pulse 100   Temp 98.8 °F (37.1 °C) (Oral)   Resp 20   Ht 5' 7.5\" (1.715 m)   Wt 175 lb (79.4 kg)   SpO2 99%   BMI 27.00 kg/m²   Gait - steady  Medication side effects(SE): no    Mental Status Examination:    Level of consciousness:  within normal limits   Appearance:  poor grooming and fair hygiene  Behavior/Motor:  psychomotor retardation  Attitude toward examiner:  cooperative  Speech:  normal rate   Mood: anxious  Affect:  mood congruent  Thought processes:  goal directed   Thought content:  Suicidal Ideation:  denies suicidal ideation  Cognition:  oriented to person, place, and time   Concentration distractible  Insight fair   Judgement fair     ASSESSMENT:   Patient symptoms are:  [] Well controlled  [x] Improving  [] Worsening  [] No change      Diagnosis:  Active Problems:    Bipolar 1 disorder, depressed ([facility] [address]  Resolved Problems:    * No resolved hospital problems. *      LABS:    No results for input(s): WBC, HGB, PLT in the last 72 hours. No results for input(s): NA, K, CL, CO2, BUN, CREATININE, GLUCOSE in the last 72 hours. No results for input(s): BILITOT, ALKPHOS, AST, ALT in the last 72 hours.   Lab Results   Component Value Date    LABAMPH POSITIVE 09/08/2021    BARBSCNU Neg 09/08/2021    LABBENZ Neg 09/08/2021    LABBENZ NotDTCD 01/05/2013    LABMETH Neg 09/08/2021    OPIATESCREENURINE Neg 09/08/2021    PHENCYCLIDINESCREENURINE Neg 09/08/2021    ETOH <10 09/08/2021     Lab Results   Component Value Date    TSH 0.775 09/08/2021     Lab Results   Component Value Date    LITHIUM <0.1 (L) 07/07/2015     Lab Results   Component Value Date    VALPROATE 55.8 09/21/2016       Treatment Plan:  Reviewed current Medications with the patient. Medication as ordered  Risks, benefits, side effects, drug-to-drug interactions and alternatives to treatment were discussed. Collateral information:   CD evaluation  Encourage patient to attend group and other milieu activities.   Discharge planning discussed with the patient and treatment team.    PSYCHOTHERAPY/COUNSELING:  [x] Therapeutic interview  [x] Supportive  [] CBT  [] Ongoing  [] Other    [x] Patient continues to need, on a daily basis, active treatment furnished directly by or requiring the supervision of inpatient psychiatric personnel      Anticipated Length of stay:            Electronically signed by Yue Carlisle MD on 9/13/2021 at 5:14 PM

## 2021-09-13 NOTE — PROGRESS NOTES
Pt. refused to attend the 1000 skills group, despite staff encouragement. Electronically signed by Eve Easton, [address] on 9/13/2021 at 12:17 PM

## 2021-09-13 NOTE — CARE COORDINATION
Family/Support Name: [facility] #: [phone]  Relationship to Patient: sister    9/13/21 @2:33 pm      Response:  Placed call to above for collateral information, left a voicemail to return call to .

## 2021-09-13 NOTE — PROGRESS NOTES
Department of Psychiatry  Attending Progress Note      SUBJECTIVE: no change. Patient continues to report discomfort, prospect of homelessness as he continues to endorse passive  suicidal ideations This is a 48 yo male admitted for severe anxiety and suicidal ideations in a setting of med nonadherence. Patient is complaining of pain and overall discouragement and anxiety as well as poor sleep  OBJECTIVE  Patient is worried does not seek help from nursing.  Isolates  Physical  VITALS:  BP (!) 108/93   Pulse 124   Temp 98.2 °F (36.8 °C) (Oral)   Resp 18   Ht 5' 7.5\" (1.715 m)   Wt 175 lb (79.4 kg)   SpO2 99%   BMI 27.00 kg/m²   CONSTITUTIONAL:  awake, alert, cooperative, no apparent distress, and appears stated age  Mental Status Examination:  No change from yesterday  Level of consciousness:  within normal limits  Appearance:  ill-appearing  Behavior/Motor:  psychomotor retardation  Attitude toward examiner:  evasive, guarded and withdrawn  Speech:  slow, whispered, increased latency and low productivity  Mood:  Depressed/anxious  Affect:  mood congruent  Thought processes:  flight of ideas  Thought content:  Preoccupied with anxiety  Homocidal ideation denies  Suicidal Ideation:  Passive suicidal ideation  Delusions:  paranoid  Perceptual Disturbance:  denies any perceptual disturbance  Cognition:  Memory impaired immediate recall  Insight:  poor  Judgment:  poor  Impulse control poor  Protective factors: none    Data  Labs:    CBC with Differential:    Lab Results   Component Value Date    WBC 6.0 09/08/2021    RBC 3.24 09/08/2021    RBC 4.92 12/31/2011    HGB 10.0 09/08/2021    HCT 29.7 09/08/2021     09/08/2021    MCV 91.5 09/08/2021    MCH 30.8 09/08/2021    MCHC 33.6 09/08/2021    RDW 15.9 09/08/2021    LYMPHOPCT 22.1 09/08/2021    MONOPCT 11.5 09/08/2021    EOSPCT 0.7 12/31/2011    BASOPCT 0.7 09/08/2021    MONOSABS 0.7 09/08/2021    LYMPHSABS 1.3 09/08/2021    EOSABS 0.2 09/08/2021    BASOSABS

## 2021-09-13 NOTE — PROGRESS NOTES
Isolates. Irritable. C/O poor sleep. Barbara good. Denies SI/HI. States voices are not present today. Depressed. Affect flat.

## 2021-09-13 NOTE — PROGRESS NOTES
Pt states depression is # 6/10, anxiety is # 5/10. Denies SI/HI, states he hasn't had voices today but he normally does. Pt does not go to groups, normally isolates to room most of the day. Pt states he is eating good, eats in day room. .Sleeps 9 hrs per night.

## 2021-09-13 NOTE — PROGRESS NOTES
Pt medicated with PRN tylenol for c/o foot pain and PRN haldol for c/o increasing auditory hallucinations r/t anxiety and stress d/t foot pain.

## 2021-09-13 NOTE — CARE COORDINATION
9/13/21 @ 2:15      Patient signed consent for [facility] referral. Irvin Webber with Jose from [facility] on 9/13/21. [place] from [facility] will speak with the patient either today or tomorrow.     *Patient denied drug and alcohol use to this writer, but stated that he would like to [employer] treatment\"

## 2021-09-13 NOTE — PROGRESS NOTES
Patient did not attend group despite staff encouragement.   Electronically signed by Irineo White on 9/13/2021 at 5:08 PM

## 2021-09-13 NOTE — CARE COORDINATION
FAMILY COLLATERAL NOTE    Family/Support Name: [facility] #: [phone]  Relationship to Pt[de-identified] sister        Family/Support contact aware of hospitalization:   Presenting Symptoms/Current Concerns:      Top 3 Life Stressors:         Background History Relevant to Current Hospitalization:          Family Mental Health/Substance Use History:         Support Network's Goal for Hospitalization:     Discharge Plan:       Support Network Supportive of Discharge Plan:       Support can confirm Safety of Location and Security of Weapons:       Support agreeable to [employer] and Monitor Medications (including Prescription and OTC):      Identified Barriers to Compliance with Discharge Plan:     Recommendations for Support Network:         Angelina Frausto, MSW, LSW

## 2021-09-13 NOTE — PROGRESS NOTES
Pt. declined to attend the 0900 community meeting, despite staff encouragement. Electronically signed by Shakira Mcgarry [address] on 9/13/2021 at 9:40 AM

## 2021-09-13 NOTE — SUICIDE SAFETY PLAN
SAFETY PLAN    A suicide Safety Plan is a document that supports someone when they are having thoughts of suicide. Warning Signs that indicate a suicidal crisis may be developing: What (situations, thoughts, feelings, body sensations, behaviors, etc.) do you experience that lets you know you are beginning to think about suicide? 1. Anxiety gets high  2.no other warning signs or triggers      Internal Coping Strategies:  What things can I do (relaxation techniques, hobbies, physical activities, etc.) to take my mind off my problems without contacting another person? 1. Work with dogs    People and social settings that provide distraction: Who can I call or where can I go to distract me? 1. [place]    People whom I can ask for help: Who can I call when I need help - for example, friends, family, clergy, someone else? 1. Name: No one/ does not want to involve family or friends                  Professionals or [facility] agencies I can contact during a crisis: Who can I call for help - for example, my doctor, my psychiatrist, my psychologist, a mental health provider, a suicide hotline? 1. Suicide Prevention Lifeline: 0-128-800-TALK (7355)    2. [address] [place] [address] Emergency Services -  for example, [facility] suicide hotline, [address] [place] [address] Avenue: [facility]   Emergency Services Address: [address] [place] [address]   Emergency Services Phone:   [phone]    3. 120    Making the environment safe: How can I make my environment (house/apartment/living space) safer? For example, can I remove guns, medications, and other items? 1.  \"Need a place to follow up with\"

## 2021-09-13 NOTE — CARE COORDINATION
Group Therapy Note    Date:9/13/2021  Start Time:1400  End Time:  1430    Number of Participants:4    Type of Group: Cognitive Skills    Patient's Goal:  To participate in mood management group. Notes: Patient declined to attend psychoeducation group at 1400 despite encouragement by staff.      Discipline Responsible: /Counselor    RONEL Barber

## 2021-09-14 PROCEDURE — 6370000000 HC RX 637 (ALT 250 FOR IP): Performed by: PSYCHIATRY & NEUROLOGY

## 2021-09-14 PROCEDURE — 1240000000 HC EMOTIONAL WELLNESS R&B

## 2021-09-14 PROCEDURE — 99232 SBSQ HOSP IP/OBS MODERATE 35: CPT | Performed by: PSYCHIATRY & NEUROLOGY

## 2021-09-14 PROCEDURE — 6370000000 HC RX 637 (ALT 250 FOR IP): Performed by: ANESTHESIOLOGY

## 2021-09-14 RX ADMIN — GABAPENTIN 300 MG: 300 CAPSULE ORAL at 14:15

## 2021-09-14 RX ADMIN — GABAPENTIN 300 MG: 300 CAPSULE ORAL at 09:26

## 2021-09-14 RX ADMIN — DIVALPROEX SODIUM 250 MG: 250 TABLET, DELAYED RELEASE ORAL at 14:15

## 2021-09-14 RX ADMIN — DIVALPROEX SODIUM 250 MG: 250 TABLET, DELAYED RELEASE ORAL at 21:49

## 2021-09-14 RX ADMIN — ATOMOXETINE 25 MG: 25 CAPSULE ORAL at 09:26

## 2021-09-14 RX ADMIN — QUETIAPINE FUMARATE 150 MG: 50 TABLET, EXTENDED RELEASE ORAL at 21:38

## 2021-09-14 RX ADMIN — QUETIAPINE FUMARATE 25 MG: 25 TABLET ORAL at 09:26

## 2021-09-14 RX ADMIN — PANTOPRAZOLE SODIUM 40 MG: 40 TABLET, DELAYED RELEASE ORAL at 16:57

## 2021-09-14 RX ADMIN — CAPSAICIN: 0.25 CREAM TOPICAL at 21:36

## 2021-09-14 RX ADMIN — PANTOPRAZOLE SODIUM 40 MG: 40 TABLET, DELAYED RELEASE ORAL at 06:13

## 2021-09-14 RX ADMIN — DIVALPROEX SODIUM 250 MG: 250 TABLET, DELAYED RELEASE ORAL at 06:12

## 2021-09-14 RX ADMIN — GABAPENTIN 300 MG: 300 CAPSULE ORAL at 21:49

## 2021-09-14 RX ADMIN — LIDOCAINE: 4 CREAM TOPICAL at 16:57

## 2021-09-14 RX ADMIN — QUETIAPINE FUMARATE 25 MG: 25 TABLET ORAL at 16:57

## 2021-09-14 NOTE — PROGRESS NOTES
[facility]. FOLLOW-UP NOTE       9/14/2021     Patient was seen and examined in person, Chart reviewed   Patient's case discussed with staff/team    Chief Complaint: depression SI    Interim History:     Pt has been isolating in his room  Pt saw the pain doctor yesterday  Pt want to go to IP rehab  Willing to meet with lets get real  Want to get back on suboxone  Less depressed  No active SI or HI  Less feeling of hopeless and worthless feeling  Appetite:   [x] Normal/Unchanged  [] Increased  [] Decreased      Sleep:       [] Normal/Unchanged  [x] Fair       [] Poor              Energy:    [] Normal/Unchanged  [] Increased  [x] Decreased        SI [] Present  [x] Absent    HI  []Present  [x] Absent     Aggression:  [] yes  [x] no    Patient is [x] able  [] unable to CONTRACT FOR SAFETY     PAST MEDICAL/PSYCHIATRIC HISTORY:   Past Medical History:   Diagnosis Date    Bipolar 1 disorder ([facility] [address]     Bleeding acute gastric ulcer     History of self injurious behavior        FAMILY/SOCIAL HISTORY:  History reviewed. No pertinent family history.   Social History     Socioeconomic History    Marital status: Single     Spouse name: Not on file    Number of children: Not on file    Years of education: Not on file    Highest education level: Not on file   Occupational History    Not on file   Tobacco Use    Smoking status: Current Every Day Smoker     Packs/day: 1.00     Years: 10.00     Pack years: 10.00     Types: Cigarettes    Smokeless tobacco: Former User     Types: Chew   Vaping Use    Vaping Use: Some days    Substances: Nicotine    Devices: Disposable   Substance and Sexual Activity    Alcohol use: Yes     Comment: occassional    Drug use: Yes     Types: Marijuana    Sexual activity: Not on file   Other Topics Concern    Not on file   Social History Narrative    Not on file     Social Determinants of Health     Financial Resource Strain:     Difficulty of Paying Living Expenses:    Food Insecurity:     Worried About [facility] in the Last Year:     [facility] [facility] in the Last Year:    Transportation Needs:     Lack of Transportation (Medical):  Lack of Transportation (Non-Medical):    Physical Activity:     Days of Exercise per Week:     Minutes of Exercise per Session:    Stress:     Feeling of Stress :    Social Connections:     Frequency of Communication with Friends and Family:     Frequency of Social Gatherings with Friends and Family:     Attends Mor[employer] Services:     Active Member of Clubs or Organizations:     Attends Club or Organization Meetings:     Marital Status:    Intimate Partner Violence:     Fear of Current or Ex-Partner:     Emotionally Abused:     Physically Abused:     Sexually Abused:            ROS:  [x] All negative/unchanged except if checked.  Explain positive(checked items) below:  [] Constitutional  [] Eyes  [] Ear/Nose/Mouth/Throat  [] Respiratory  [] CV  [] GI  []   [] Musculoskeletal  [] Skin/Breast  [] Neurological  [] Endocrine  [] Heme/Lymph  [] Allergic/Immunologic    Explanation:     MEDICATIONS:    Current Facility-Administered Medications:     QUEtiapine (SEROQUEL XR) extended release tablet 150 mg, 150 mg, Oral, Nightly, Keyur Puga MD, 150 mg at 09/13/21 2131    atomoxetine (STRATTERA) capsule 25 mg, 25 mg, Oral, Daily, Charo Boyce MD, 25 mg at 09/14/21 0926    nicotine (NICODERM CQ) 21 MG/24HR 1 patch, 1 patch, TransDERmal, Daily, Charo Boyce MD, 1 patch at 09/14/21 0926    lidocaine (LMX) 4 % cream, , Topical, 4x Daily, Mery Becerra MD, Given at 09/14/21 0931    capsaicin (ZOSTRIX) 0.025 % cream, , Topical, BID, Mery Becerra MD, Given at 09/13/21 2129    gabapentin (NEURONTIN) capsule 300 mg, 300 mg, Oral, TID, Mery Becerra MD, 300 mg at 09/14/21 0926    divalproex (DEPAKOTE) DR tablet 250 mg, 250 mg, Oral, 3 times per day, Charo Boyce MD, 250 mg at 09/14/21 0612    QUEtiapine (SEROQUEL) tablet 25 mg, 25 mg, Oral, BID, Paddy Cheung MD, 25 mg at 09/14/21 0926    pantoprazole (PROTONIX) tablet 40 mg, 40 mg, Oral, BID AC, Eben Ramirez MD, 40 mg at 09/14/21 2253    hydrOXYzine (VISTARIL) capsule 50 mg, 50 mg, Oral, Q6H PRN, 50 mg at 09/12/21 1853 **OR** hydrOXYzine (VISTARIL) injection 50 mg, 50 mg, IntraMUSCular, Q6H PRN, Eben Ramirez MD    acetaminophen (TYLENOL) tablet 650 mg, 650 mg, Oral, Q4H PRN, Eben Ramirez MD, 650 mg at 09/13/21 1542    magnesium hydroxide (MILK OF MAGNESIA) 400 MG/5ML suspension 30 mL, 30 mL, Oral, Daily PRN, Eben Ramirez MD    aluminum & magnesium hydroxide-simethicone (MAALOX) 200-200-20 MG/5ML suspension 30 mL, 30 mL, Oral, PRN, Eben Ramirez MD    haloperidol (HALDOL) tablet 5 mg, 5 mg, Oral, Q6H PRN, 5 mg at 09/12/21 2319 **OR** haloperidol lactate (HALDOL) injection 5 mg, 5 mg, IntraMUSCular, Q6H PRN, Eben Ramirez MD    benztropine mesylate (COGENTIN) injection 2 mg, 2 mg, IntraMUSCular, BID PRN, Eben Ramirez MD    traZODone (DESYREL) tablet 50 mg, 50 mg, Oral, Nightly PRN, Eben Ramirez MD, 50 mg at 09/13/21 0147      Examination:  BP 93/64   Pulse 110   Temp 98.4 °F (36.9 °C) (Oral)   Resp 20   Ht 5' 7.5\" (1.715 m)   Wt 175 lb (79.4 kg)   SpO2 99%   BMI 27.00 kg/m²   Gait - steady  Medication side effects(SE): no    Mental Status Examination:    Level of consciousness:  within normal limits   Appearance:  fair grooming and fair hygiene  Behavior/Motor:  psychomotor retardation  Attitude toward examiner:  cooperative  Speech:  well articulated   Mood:less anxious and depressed  Affect:  mood congruent  Thought processes:  goal directed   Thought content:  Suicidal Ideation:  denies suicidal ideation  Cognition:  oriented to person, place, and time   Concentration intact  Insight fair   Judgement fair     ASSESSMENT:   Patient symptoms are:  [] Well controlled  [] Improving  [] Worsening  [] No change      Diagnosis:  Principal Problem:    Bipolar 1 disorder, depressed (HCC)  Active Problems:    Acute GI bleeding    Delta-9-tetrahydrocannabinol (THC) dependence (HCC)    Amphetamine abuse (HCC)    Idiopathic neuropathy    Peptic ulcer disease    Muscle soreness    Painful legs and moving toes    Acquired flaccid flat foot    Chronic bilateral low back pain without sciatica    Lumbago with sciatica  Resolved Problems:    * No resolved hospital problems. *      LABS:    No results for input(s): WBC, HGB, PLT in the last 72 hours. No results for input(s): NA, K, CL, CO2, BUN, CREATININE, GLUCOSE in the last 72 hours. No results for input(s): BILITOT, ALKPHOS, AST, ALT in the last 72 hours. Lab Results   Component Value Date    [facility] POSITIVE 09/08/2021    BARBSCNU Neg 09/08/2021    LABBENZ Neg 09/08/2021    LABBENZ NotDTCD 01/05/2013    LABMETH Neg 09/08/2021    OPIATESCREENURINE Neg 09/08/2021    PHENCYCLIDINESCREENURINE Neg 09/08/2021    ETOH <10 09/08/2021     Lab Results   Component Value Date    TSH 0.775 09/08/2021     Lab Results   Component Value Date    LITHIUM <0.1 (L) 07/07/2015     Lab Results   Component Value Date    VALPROATE 55.8 09/21/2016       Treatment Plan:  Reviewed current Medications with the patient. Referral to [facility]  Risks, benefits, side effects, drug-to-drug interactions and alternatives to treatment were discussed. Collateral information:   CD evaluation  Encourage patient to attend group and other milieu activities.   Discharge planning discussed with the patient and treatment team.    PSYCHOTHERAPY/COUNSELING:  [x] Therapeutic interview  [x] Supportive  [] CBT  [] Ongoing  [] Other    [x] Patient continues to need, on a daily basis, active treatment furnished directly by or requiring the supervision of inpatient psychiatric personnel      Anticipated Length of stay:            Electronically signed by Mak Duncan MD on 9/14/2021 at 11:37 AM

## 2021-09-14 NOTE — PROGRESS NOTES
Patient did not attend group despite staff encouragement.   Electronically signed by Morris Urias on 9/14/2021 at 5:19 PM

## 2021-09-14 NOTE — PROGRESS NOTES
Pt. refused to attend the 1000 skills group, despite staff encouragement. Electronically signed by Evon Hogan, [address] on 9/14/2021 at 1:43 PM

## 2021-09-14 NOTE — PROGRESS NOTES
Patient did not attend group due to being off unit for a procedure.   Electronically signed by Lani Sarmiento on 9/13/2021 at 9:42 PM

## 2021-09-14 NOTE — PROGRESS NOTES
Pt. declined to attend the 0900 community meeting, despite staff encouragement. Electronically signed by Kong Doss, [address] on 9/14/2021 at 1:43 PM

## 2021-09-14 NOTE — CONSULTS
INITIAL CONSULT -PAIN MANAGEMENT     SERVICE DATE:  9/13/2021   SERVICE TIME:  8:03 PM  Admission date 9/9/2021  REASON FORCONSULT: Bilateral foot pain, back pain  REQUESTING PHYSICIAN: Psychiatry team  Jolene Quach MD, Aleja Briones MD    Admission chief complaint   Chief Complaint   Patient presents with    Psychiatric Evaluation         HISTORY OF PRESENTILLNESS:  Mr. Dawson Estrella is a 47 y.o. male who presents for psychiatric evaluation after patient reports they are having severe depression and anxiety and concerned about suicidal ideation. I was consulted to evaluate patient bilateral foot pain, back pain and discussion about Neurontin medication he was taking in the past and has not taken it lately. Patient has chronic history with chronic foot pain, also chronic back pain however he was prescribed Neurontin by his PCP for several months to a year for neuropathic pain in the foot, described the dose was up to 600 g 3 times daily, he was tolerating well and stated that was helping a lot of his foot pain, described the pain as sharp, numbness was tender needle, he reported he has some imaging long time ago done but at [facility] not available in our record here in [address] [place] [address]. He has describe also when he was sick gabapentin that was helping a lot of the foot pain and he was able to walk and stand and perform his work physical activity. Major part of patient admission due to some social financial distress and work issues. Admission showed positive THC and amphetamine, patient stated that he used THC occasionally recreationally, amphetamine related to use of Adderall pill that he took from a friend. Patient has last prescription of gabapentin was early in 2021, prescribed by his PCP however he stated that he has been homeless, not able to follow-up with his care, concerned about noncompliance but more related to financial region.     He expressed concern that he would like to have some treatment for his foot and back pain. PAIN  ASSESSMENT:    constant, waxing and waning    aching, shooting, stabbing and throbbing    pain is perceived as severe (6-8 pain scale)    PAST MEDICAL HISTORY:    Past Medical History:   Diagnosis Date    Bipolar 1 disorder ([address]     Bleeding acute gastric ulcer     History of self injurious behavior      PAST SURGICAL HISTORY:    Past Surgical History:   Procedure Laterality Date    UPPER GASTROINTESTINAL ENDOSCOPY N/A 8/26/2021    ESOPHAGOGASTRODUODENOSCOPY. PT. CURRENTLY IN ER performed by Tanisha Russell MD at [address] [place] [address]:  History reviewed. No pertinent family history. SOCIALHISTORY:    Social History     Socioeconomic History    Marital status: Single     Spouse name: Not on file    Number of children: Not on file    Years of education: Not on file    Highest education level: Not on file   Occupational History    Not on file   Tobacco Use    Smoking status: Current Every Day Smoker     Packs/day: 1.00     Years: 10.00     Pack years: 10.00     Types: Cigarettes    Smokeless tobacco: Former User     Types: Chew   Vaping Use    Vaping Use: Some days    Substances: Nicotine    Devices: Disposable   Substance and Sexual Activity    Alcohol use: Yes     Comment: occassional    Drug use: Yes     Types: Marijuana    Sexual activity: Not on file   Other Topics Concern    Not on file   Social History Narrative    Not on file     Social Determinants of Health     Financial Resource Strain:     Difficulty of Paying Living Expenses:    Food Insecurity:     Worried About [address] in the Last Year:     [address] in the Last Year:    Transportation Needs:     Lack of Transportation (Medical):      Lack of Transportation (Non-Medical):    Physical Activity:     Days of Exercise per Week:     Minutes of Exercise per Session:    Stress:     Feeling of Stress :    Social Connections:     Frequency of Communication with Friends and Family:     Frequency of Social Gatherings with Friends and Family:     Attends Mosque Services:     Active Member of Clubs or Organizations:     Attends Club or Organization Meetings:     Marital Status:    Intimate Partner Violence:     Fear of Current or Ex-Partner:     Emotionally Abused:     Physically Abused:     Sexually Abused:      PSYCHOLOGICAL HISTORY: Bipolar disorder, anxiety    MEDICATIONS:  Medications Prior to Admission: pantoprazole (PROTONIX) 40 MG tablet, Take 1 tablet by mouth 2 times daily (before meals)  [unfilled]    ALLERGIES:  Effexor [venlafaxine], Remeron [mirtazapine], and Seasonal    COMPLETE REVIEW OF SYSTEMS:  As noted in HPI, 12 point ROS reviewed and otherwise negative. Review of Systems   Constitutional: Positive for activity change, appetite change and fatigue. Negative for chills, diaphoresis, fever and unexpected weight change. HENT: Negative. Eyes: Negative. Respiratory: Negative. Cardiovascular: Negative. Gastrointestinal: Negative. Endocrine: Negative. Genitourinary: Negative. Musculoskeletal: Positive for arthralgias, back pain, joint swelling and myalgias. Negative for gait problem, neck pain and neck stiffness. Skin: Negative. Allergic/Immunologic: Negative. Neurological: Positive for numbness. Negative for dizziness, tremors, seizures, syncope, facial asymmetry, speech difficulty, weakness, light-headedness and headaches. Hematological: Negative. Psychiatric/Behavioral: Positive for behavioral problems, decreased concentration, dysphoric mood and sleep disturbance. Negative for agitation, confusion, hallucinations, self-injury and suicidal ideas. The patient is nervous/anxious. The patient is not hyperactive.           OBJECTIVE  PHYSICAL EXAM:  /75   Pulse 100   Temp 98.8 °F (37.1 °C) (Oral)   Resp 20   Ht 5' 7.5\" (1.715 m)   Wt 175 lb (79.4 kg)   SpO2 99%   BMI 27.00 kg/m²   Body mass index is 27 kg/m². CONSTITUTIONAL: Awake, alert, cooperative, appears to be generally depressed, no distress. EYES:  vision intact  ENT:  normocepalic, without obvious abnormality, atraumatic  NECK:  supple, symmetrical, trachea midline, skin normal and no stridor  BACK: Limited range of motion, mild paraspinal lumbosacral tenderness, symmetric and no curvature, he has some radiating pain down the right leg with sitting leg raise. LUNGS:  no increased work of breathing  CARDIOVASCULAR:  regular rate and rhythm  ABDOMEN: Soft and nondistended  MUSCULOSKELETAL:  there is no redness, warmth, or swelling of the joints  full range of motion noted  motor strength is 5 out of 5 all extremities bilaterally  tone is normal  NEUROLOGIC: Neurologically stable, I do not see any motor or sensory deficit, gait is slightly tilted, motor and sensory to be symmetrical.  SKIN:  no bruising or bleeding    DATA:   Diagnostic tests reviewed for today's visit:    All labs and imaging results reviewed. Lab Results   Component Value Date    WBC 6.0 09/08/2021    HGB 10.0 09/08/2021    HCT 29.7 09/08/2021    MCV 91.5 09/08/2021     09/08/2021     09/08/2021    K 4.3 09/08/2021     09/08/2021    CO2 24 09/08/2021    BUN 8 09/08/2021    CREATININE 0.65 09/08/2021    CALCIUM 8.4 09/08/2021    ALKPHOS 72 09/08/2021    ALT 21 09/08/2021    AST 15 09/08/2021    BILITOT 0.3 09/08/2021    BILIDIR 0.0 09/28/2015    LABALBU 3.7 09/08/2021    LABALBU 4.7 12/31/2011     No results for input(s): LABAMPH, BARBSCNU, LABBENZ, CANSU, COCAIMETSCRU, OPIATESCREENURINE, OXYCODONEUR, DSCOMMENT in the last 72 hours. Invalid input(s): PHENCYCLIDINESCREENURINE. LABMETH. PROPOX   XR CHEST PORTABLE    Result Date: 8/26/2021  EXAMINATION: XR CHEST PORTABLE CLINICAL HISTORY: SWEATING. FEVERISH. VOMITING. COMPARISONS: None available. FINDINGS: Osseous structures intact.  Cardiopericardial silhouette normal. Pulmonary vasculature normal. Lungs clear. NO ACUTE CARDIOPULMONARY DISEASE.    US ABDOMEN LIMITED    Result Date: 8/27/2021  EXAMINATION: US ABDOMEN LIMITED CLINICAL HISTORY: ABNORMAL LIVER FUNCTION STUDIES. FINDINGS: Liver is normal in size, shape, and echogenicity. No intrahepatic and hepatic ductal dilatation. Common duct measures 3 mm. Color flow without anomaly. Gallbladder partially collapsed and contains no shadowing and no echogenic foci. No para cholecystic fluid. No gallbladder wall thickening. Pancreatic head and body normal in size, shape, and echogenicity. Portions of pancreatic tail obscured by overlying bowel gas. NEGATIVE ABDOMINAL ULTRASOUND. [facility] [address] Problems    Diagnosis Date Noted    Delta-9-tetrahydrocannabinol [facility]) dependence ([facility] [F12.20] 09/13/2021    Amphetamine abuse ([facility] [F15.10] 09/13/2021    Substance induced mood disorder ([facility] [F19.94] 09/13/2021    Idiopathic neuropathy [G60.9] 09/13/2021    Peptic ulcer disease [K27.9] 09/13/2021    Muscle soreness [M79.10] 09/13/2021    Painful legs and moving toes [M79.604, M79.605] 09/13/2021    Acquired flaccid flat foot [M21.40] 09/13/2021    Chronic bilateral low back pain without sciatica [M54.5, G89.29] 09/13/2021    Lumbago with sciatica [M54.40] 09/13/2021    Bipolar 1 disorder, depressed ([facility] [F31.9] 09/08/2021    Acute GI bleeding [K92.2] 08/26/2021     47years old male who has chronic history with bipolar disorder, depression and anxiety, patient was admitted to the hospital due to psychiatric instability, probably associated with social stress. Patient has history of chronic foot pain, he was under care of his PCP for several years, take Neurontin up to 600 to 1200 mg 3 times daily for clinical diagnosis of idiopathic neuropathy for both feet and chronic feet pain.   Patient did not have any EMG, he has not seen by neurologist, he was told he had flatfoot, I do not see any recent x-ray on the epic but probably had all the imaging done at [facility].    Patient described that when he was on the gabapentin he was able to feel his pain is less, also is coming a lot of his anxiety, helping what he described as neuropathic pain in his bolus feeds to be the same, describes sharp tingling and pins-and-needles in both feet with any foot pressure. I do not have any diagnostic imaging also is complaining of shifting gait and back pain muscle spasm of legs. RECOMMENDATION:  SEE ORDERS    I would recommend for x-ray baseline to both feet to evaluate for bone spur or other abnormalities, if noted abnormality would be referring to podiatry. I would be recommending capsaicin cream, lidocaine cream mix which worked better for peripheral neuropathic idiopathic pain. I would be starting the patient on gabapentin 300 g 3 times daily, prescription to home for 3 mg twice daily if successful response to it while he has been hospitalized, provided that controlling patient pain will be enabling patient to persuade more of a psychological treatment recommended to provide his wellbeing. I will be also ordering x-ray as a baseline for the back since been complaining of back pain and radiating leg pain. Education and counseling avoid THC and amphetamine, patient claimed that he was Adderall as he thought will control some anxiety as he has been getting depressed and stressed lately, I educated to avoid such use of nonprescribed medication, he expressed understanding. I do believe patient's major component of his admission was related to social distress that if it is not corrected probably would be high risk for recurrence and relapse. I have addressed his pain management component however I do see that anxiety/bipolar, social distress and pain management are one component for him.     Continue care per psychiatric team.    SIGNATURE: Elizabeth Lo MD PATIENT NAME: Eduardo Jose E   DATE: September 13, 2021 MRN: [id number]   TIME: 8:03 PM PAGER/CONTACT #: [phone]

## 2021-09-14 NOTE — CARE COORDINATION
Family/Support Name: [facility] #: [phone]  Relationship to Patient: sister  9/14/21 @ 3:00pm    Writer placed a call to above for collateral information. Writer left a hippa compliant voice mail to return call.

## 2021-09-14 NOTE — PROGRESS NOTES
Pt reports his room is very hot and making him sick, and not feel like functioning  notified, charge nurse aware.

## 2021-09-15 VITALS
TEMPERATURE: 98.4 F | RESPIRATION RATE: 20 BRPM | OXYGEN SATURATION: 99 % | HEART RATE: 110 BPM | SYSTOLIC BLOOD PRESSURE: 93 MMHG | BODY MASS INDEX: 26.52 KG/M2 | WEIGHT: 175 LBS | DIASTOLIC BLOOD PRESSURE: 64 MMHG | HEIGHT: 68 IN

## 2021-09-15 PROCEDURE — 6370000000 HC RX 637 (ALT 250 FOR IP): Performed by: PSYCHIATRY & NEUROLOGY

## 2021-09-15 PROCEDURE — 86147 CARDIOLIPIN ANTIBODY EA IG: CPT

## 2021-09-15 PROCEDURE — 6370000000 HC RX 637 (ALT 250 FOR IP): Performed by: ANESTHESIOLOGY

## 2021-09-15 PROCEDURE — 90833 PSYTX W PT W E/M 30 MIN: CPT | Performed by: PSYCHIATRY & NEUROLOGY

## 2021-09-15 PROCEDURE — 99231 SBSQ HOSP IP/OBS SF/LOW 25: CPT | Performed by: PSYCHIATRY & NEUROLOGY

## 2021-09-15 PROCEDURE — 87389 HIV-1 AG W/HIV-1&-2 AB AG IA: CPT

## 2021-09-15 PROCEDURE — 1240000000 HC EMOTIONAL WELLNESS R&B

## 2021-09-15 PROCEDURE — 36415 COLL VENOUS BLD VENIPUNCTURE: CPT

## 2021-09-15 PROCEDURE — 86592 SYPHILIS TEST NON-TREP QUAL: CPT

## 2021-09-15 RX ORDER — DIVALPROEX SODIUM 250 MG/1
250 TABLET, DELAYED RELEASE ORAL EVERY 8 HOURS SCHEDULED
Qty: 90 TABLET | Refills: 1 | Status: SHIPPED | OUTPATIENT
Start: 2021-09-15

## 2021-09-15 RX ORDER — QUETIAPINE FUMARATE 25 MG/1
25 TABLET, FILM COATED ORAL 2 TIMES DAILY
Qty: 60 TABLET | Refills: 1 | Status: SHIPPED | OUTPATIENT
Start: 2021-09-15

## 2021-09-15 RX ORDER — QUETIAPINE 150 MG/1
150 TABLET, FILM COATED, EXTENDED RELEASE ORAL NIGHTLY
Qty: 30 TABLET | Refills: 1 | Status: SHIPPED | OUTPATIENT
Start: 2021-09-15

## 2021-09-15 RX ORDER — ATOMOXETINE 25 MG/1
25 CAPSULE ORAL DAILY
Qty: 30 CAPSULE | Refills: 1 | Status: SHIPPED | OUTPATIENT
Start: 2021-09-15

## 2021-09-15 RX ADMIN — QUETIAPINE FUMARATE 25 MG: 25 TABLET ORAL at 09:59

## 2021-09-15 RX ADMIN — CAPSAICIN: 0.25 CREAM TOPICAL at 21:21

## 2021-09-15 RX ADMIN — PANTOPRAZOLE SODIUM 40 MG: 40 TABLET, DELAYED RELEASE ORAL at 06:50

## 2021-09-15 RX ADMIN — LIDOCAINE: 4 CREAM TOPICAL at 17:09

## 2021-09-15 RX ADMIN — DIVALPROEX SODIUM 250 MG: 250 TABLET, DELAYED RELEASE ORAL at 21:20

## 2021-09-15 RX ADMIN — GABAPENTIN 300 MG: 300 CAPSULE ORAL at 13:50

## 2021-09-15 RX ADMIN — PANTOPRAZOLE SODIUM 40 MG: 40 TABLET, DELAYED RELEASE ORAL at 17:09

## 2021-09-15 RX ADMIN — LIDOCAINE: 4 CREAM TOPICAL at 21:21

## 2021-09-15 RX ADMIN — DIVALPROEX SODIUM 250 MG: 250 TABLET, DELAYED RELEASE ORAL at 06:09

## 2021-09-15 RX ADMIN — GABAPENTIN 300 MG: 300 CAPSULE ORAL at 09:59

## 2021-09-15 RX ADMIN — QUETIAPINE FUMARATE 25 MG: 25 TABLET ORAL at 17:09

## 2021-09-15 RX ADMIN — ATOMOXETINE 25 MG: 25 CAPSULE ORAL at 09:59

## 2021-09-15 RX ADMIN — DIVALPROEX SODIUM 250 MG: 250 TABLET, DELAYED RELEASE ORAL at 13:49

## 2021-09-15 RX ADMIN — QUETIAPINE FUMARATE 150 MG: 50 TABLET, EXTENDED RELEASE ORAL at 21:20

## 2021-09-15 RX ADMIN — GABAPENTIN 300 MG: 300 CAPSULE ORAL at 21:20

## 2021-09-15 NOTE — PROGRESS NOTES
Pt awake in bed. Kristan has been talking with [facility] , and is hopeful for rehab. Kristan lost all his belongings when living in [facility]. 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 Issues with the homes plumbing ,and the owner sold the home,and became homeless. Reports relapsed on opiates. Future oriented, and hopeful for tx. Reports feeling less depressed and anxious. Denies SI,HI,hallucinations. Reports difficulty with sleep at night r/t thoughts about his future.

## 2021-09-15 NOTE — GROUP NOTE
Group Therapy Note    Date: 9/15/2021    Group Start Time: 2098  Group End Time: 2672  Group Topic: Recreational    [facility]    Gabe Valdez        Group Therapy Note    Attendees: 11/19         Patient's Goal:  To play [employer] with peers. Notes:  Patient played the game with the group. Status After Intervention:  Unchanged    Participation Level:  Active Listener    Participation Quality: Appropriate, Attentive and Supportive      Speech:  normal      Thought Process/Content: Logical      Affective Functioning: Flat      Mood: euthymic      Level of consciousness:  Alert and Attentive      Response to Learning: Progressing to goal      Endings: None Reported    Modes of Intervention: Activity      Discipline Responsible: [address] [place] [employer]      Signature:  Gabe Valdez

## 2021-09-15 NOTE — PROGRESS NOTES
Pt. declined to attend the 0900 community meeting, despite staff encouragement.  Electronically signed by Vasile Liang on 9/15/2021 at 9:57 AM

## 2021-09-15 NOTE — DISCHARGE SUMMARY
DISCHARGE SUMMARY      Patient ID:  Wilton Solano  [id number]  89 y.o.  1980      Admit date: 9/8/2021    Discharge date and time: 9/15/2021    Admitting Physician: Shelia Higgins MD     Discharge Physician: Dr Eamon Alvares MD    Admission Diagnoses: Bipolar 1 disorder, depressed ([facility] [F31.9]    Admission Condition: poor    Discharged Condition: stable    Admission Circumstance:      The patient is a 47 y.o. male with significant past history of bipolar disorder  Lives at his grandmothers house, does not work, on disability, history of cutting visual scaring to confirm seen on upper torso  presents as a walk in to the ED with complaints of anxiety, depression, thoughts of self injury, and SI w/o a plan, and non-directive AH.  Denies HI and Kaitlyn Vishnu states that his anxiety is severe.  Patient seen rocking back and forth during interview and poor eyecontact.   Patient has not been on medicines for mental health in several years.   Patient denies any alcohol or drug use, however tested positive for THC and amphetamines in his urine scre     Stressors:unable to elicit any     The patient is not currently receiving care for the above psychiatric illness.     Medications Prior to Admission:   Prescriptions Prior to Admission   Medications Prior to Admission: pantoprazole (PROTONIX) 40 MG tablet, Take 1 tablet by mouth 2 times daily (before meals)        Compliance:no     Psychiatric Review of Systems       Depression: yes     Sarika or Hypomania:  yes - mood swings anger impulsive     Panic Attacks:  yes      Phobias:  no     Obsessions and Compulsions:  no     PTSD : no     Hallucinations:  AH     Delusions:  no     Substance Abuse History:  ETOH: no   Marijuana: yes  Opiates: no  Other Drugs: yes amphtamine        Past Psychiatric History:  Prior Diagnosis:  Bipolar disorder  Psychiatrist: no  Therapist:no  Hospitalization: yes  Hx of Suicidal Attempts: yes  Hx of violence:  yes  ECT: no  Previous discontinued MEDICATIONS:    Current Facility-Administered Medications:     QUEtiapine (SEROQUEL XR) extended release tablet 150 mg, 150 mg, Oral, Nightly, Keyur Puga MD, 150 mg at 09/14/21 2138    atomoxetine (STRATTERA) capsule 25 mg, 25 mg, Oral, Daily, Anuja Camacho MD, 25 mg at 09/14/21 0926    nicotine (NICODERM CQ) 21 MG/24HR 1 patch, 1 patch, TransDERmal, Daily, Anuja Camacho MD, 1 patch at 09/14/21 0926    lidocaine (LMX) 4 % cream, , Topical, 4x Daily, Miracle Verma MD, Given at 09/14/21 1657    capsaicin (ZOSTRIX) 0.025 % cream, , Topical, BID, Miracle Verma MD, Given at 09/14/21 2136    gabapentin (NEURONTIN) capsule 300 mg, 300 mg, Oral, TID, Miracle Verma MD, 300 mg at 09/14/21 2149    divalproex (DEPAKOTE) DR tablet 250 mg, 250 mg, Oral, 3 times per day, Anuja Camacho MD, 250 mg at 09/15/21 0609    QUEtiapine (SEROQUEL) tablet 25 mg, 25 mg, Oral, BID, Anuja Camacho MD, 25 mg at 09/14/21 1657    pantoprazole (PROTONIX) tablet 40 mg, 40 mg, Oral, BID AC, Julio Jose MD, 40 mg at 09/15/21 0650    hydrOXYzine (VISTARIL) capsule 50 mg, 50 mg, Oral, Q6H PRN, 50 mg at 09/12/21 1853 **OR** hydrOXYzine (VISTARIL) injection 50 mg, 50 mg, IntraMUSCular, Q6H PRN, Julio Jose MD    acetaminophen (TYLENOL) tablet 650 mg, 650 mg, Oral, Q4H PRN, Julio Jose MD, 650 mg at 09/13/21 1542    magnesium hydroxide (MILK OF MAGNESIA) 400 MG/5ML suspension 30 mL, 30 mL, Oral, Daily PRN, Julio Jose MD    aluminum & magnesium hydroxide-simethicone (MAALOX) 200-200-20 MG/5ML suspension 30 mL, 30 mL, Oral, PRN, Julio Jose MD    haloperidol (HALDOL) tablet 5 mg, 5 mg, Oral, Q6H PRN, 5 mg at 09/12/21 8615 **OR** haloperidol lactate (HALDOL) injection 5 mg, 5 mg, IntraMUSCular, Q6H PRN, Julio Jose MD    benztropine mesylate (COGENTIN) injection 2 mg, 2 mg, IntraMUSCular, BID PRN, Julio Jose MD    traZODone (DESYREL) tablet 50 mg, 50 mg, Oral, Nightly PRN, Rusty Rivera MD, 50 mg at 09/13/21 0147    Examination:  BP 93/64   Pulse 110   Temp 98.6 °F (37 °C)   Resp 20   Ht 5' 7.5\" (1.715 m)   Wt 175 lb (79.4 kg)   SpO2 99%   BMI 27.00 kg/m²   Gait - steady    HOSPITAL COURSE[de-identified]  Following admission to the hospital, patient had a complete physical exam and blood work up  Patient was monitored closely with suicide precaution  Patient was started on medication as listed below  Was encouraged to participate in group and other milieu activity  Patient started to feel better with this combination of treatment. Significant progress in the symptoms since admission. Mood better, with the score of 2/10 - bad  No AVH or paranoid thoughts  n oHopeless or worthless feeling  No active SI/HI  Appetite:  [x] Normal  [] Increased  [] Decreased    Sleep:       [x] Normal  [] Fair       [] Poor            Energy:    [x] Normal  [] Increased  [] Decreased     SI [] Present  [x] Absent  HI  []Present  [x] Absent   Aggression:  [] yes  [] no  Patient is [x] able  [] unable to CONTRACT FOR SAFETY   Medication side effects(SE):  [x] None(Psych.  Meds.) [] Other      Mental Status Examination on discharge:    Level of consciousness:  within normal limits   Appearance:  well-appearing  Behavior/Motor:  no abnormalities noted  Attitude toward examiner:  attentive and good eye contact  Speech:  spontaneous, normal rate and normal volume   Mood: euthymic  Affect:  mood congruent  Thought processes:  coherent   Thought content:  Suicidal Ideation:  denies suicidal ideation  Cognition:  oriented to person, place, and time   Concentration intact  Memory intact  Insight good   Judgement fair   Fund of Knowledge adequate      ASSESSMENT:  Patient symptoms are:  [x] Well controlled  [x] Improving  [] Worsening  [] No change      Diagnosis:  Principal Problem:    Bipolar 1 disorder, depressed ([facility]  Active Problems:    Acute GI bleeding    Delta-9-tetrahydrocannabinol (THC) dependence ([facility])    Amphetamine abuse ([facility])    Idiopathic neuropathy    Peptic ulcer disease    Muscle soreness    Painful legs and moving toes    Acquired flaccid flat foot    Chronic bilateral low back pain without sciatica    Lumbago with sciatica  Resolved Problems:    * No resolved hospital problems. *      LABS:    No results for input(s): WBC, HGB, PLT in the last 72 hours. No results for input(s): NA, K, CL, CO2, BUN, CREATININE, GLUCOSE in the last 72 hours. No results for input(s): BILITOT, ALKPHOS, AST, ALT in the last 72 hours. Lab Results   Component Value Date    Counts include 234 beds at the [facility] 09/08/2021    BARBSCNU Neg 09/08/2021    LABBENZ Neg 09/08/2021    LABBENZ NotDTCD 01/05/2013    LABMETH Neg 09/08/2021    OPIATESCREENURINE Neg 09/08/2021    PHENCYCLIDINESCREENURINE Neg 09/08/2021    ETOH <10 09/08/2021     Lab Results   Component Value Date    TSH 0.775 09/08/2021     Lab Results   Component Value Date    LITHIUM <0.1 (L) 07/07/2015     Lab Results   Component Value Date    VALPROATE 55.8 09/21/2016       RISK ASSESSMENT AT DISCHARGE: Low risk for suicide and homicide. Treatment Plan:  Reviewed current Medications with the patient. Education provided on the complaince with treatment. Risks, benefits, side effects, drug-to-drug interactions and alternatives to treatment were discussed. Encourage patient to attend outpatient follow up appointment and therapy. Patient was advised to call the outpatient provider, visit the nearest ED or call 911 if symptoms are not manageable. Patient's family member was contacted prior to the discharge. Medication List      START taking these medications    atomoxetine 25 MG capsule  Commonly known as: STRATTERA  Take 1 capsule by mouth daily     capsaicin 0.025 % cream  Commonly known as: ZOSTRIX  Apply topically 2 times daily.      divalproex 250 MG DR tablet  Commonly known as: DEPAKOTE  Take 1 tablet by mouth every 8 hours     gabapentin 300 MG capsule  Commonly known as: NEURONTIN  Take 1 capsule by mouth 2 times daily for 30 days. lidocaine 4 % cream  Commonly known as: LMX  Apply topically as needed. * QUEtiapine 25 MG tablet  Commonly known as: SEROQUEL  Take 1 tablet by mouth 2 times daily     * QUEtiapine 150 MG Tb24 extended release tablet  Commonly known as: SEROQUEL XR  Take 1 tablet by mouth nightly         * This list has 2 medication(s) that are the same as other medications prescribed for you. Read the directions carefully, and ask your doctor or other care provider to review them with you.             CONTINUE taking these medications    pantoprazole 40 MG tablet  Commonly known as: PROTONIX  Take 1 tablet by mouth 2 times daily (before meals)           Where to Get Your Medications      These medications were sent to [facility] [address] [phone] -  [phone] [address] [facility] [address]    Phone: [phone]   · capsaicin 0.025 % cream  · gabapentin 300 MG capsule  · lidocaine 4 % cream     You can get these medications from any pharmacy    Bring a paper prescription for each of these medications  · atomoxetine 25 MG capsule  · divalproex 250 MG DR tablet  · QUEtiapine 150 MG Tb24 extended release tablet  · QUEtiapine 25 MG tablet           Reason for more than one antipsychotic:   [x] N/A  [] 3 failed monotherapy(drugs tried):  [] Cross over to a new antipsychotic  [] Taper to monotherapy from polypharmacy  [] Augmentation of Clozapine therapy due to treatment resistance to single therapy        TIME SPEND - 35 MINUTES TO COMPLETE THE EVALUATION, DISCHARGE SUMMARY, MEDICATION RECONCILIATION AND FOLLOW UP CARE     Signed:  Mima Vargas MD  9/15/2021  9:40 AM

## 2021-09-15 NOTE — CARE COORDINATION
Group Therapy Note    Date: 9/15/2021  Start Time: 1410  End Time:  8802    Number of Participants: 7    Type of Group: Psychoeducation    Patient's Goal:  Patient learned about vitality and serenity. Notes: Patient declined to attend psychoeducation group at 1410 despite encouragement by staff.      Discipline Responsible: /Counselor    RONEL Souza

## 2021-09-15 NOTE — PROGRESS NOTES
Pt states depression is #4/10, anxiety is # 6/10. Denies SI/HI/AVH. Pt states his foot pain is improved since pain management Dr fernándezStates appetite is good. Does not go to groups. Isolates to room most of the day. States he is sleeping 8 hrs.

## 2021-09-15 NOTE — PROGRESS NOTES
Patient did not attend group despite staff encouragement.   Electronically signed by Morris Urias on 9/14/2021 at 9:17 PM

## 2021-09-15 NOTE — PROGRESS NOTES
Pt is resting in bed now, into see the doctor this am, pt reports he is hopeful to go to the [facility] or [facility] [employer]. Explained and gave am meds, Neurontin 300mg . Pt expressed being future oriented.

## 2021-09-15 NOTE — CARE COORDINATION
FAMILY COLLATERAL NOTE    Family/Support Name: Omid Charlton #:[phone]  Relationship to Pt[de-identified] sister        Family/Support contact aware of hospitalization:  Presenting Symptoms/Current Concerns:  Shared with sister was experiencing SI and had internal bleeding from a ruptured ulcer. Was not currently taking psych meds. Sister thinks he is ready to go back to tx. Patient was staying in a hotel detoxing  ( week of aug ) and then staying at a group home    Patient had passive SI but never stated intent or plan. Thoughts were increasing. Top 3 Life Stressors:   Substance use  paients mom'  Not being in control   Not having a reliable place to stay    Background History Relevant to Current Hospitalization:    Patient  Was missing from 2020 and recently found. Was at [facility] for almost 6 days before her left. [employer] it was uncomfortable this past august.       Family Mental Health/Substance Use History:   Dad's side depression, anxiety, opiod addiction  Moms side , opiod and alcohol  Siblings, anxiety depression  Brother , anger mngt  Brother       Support Network's Goal for Hospitalization:  [place] [facility] and [facility]    Discharge Plan: [facility] bed transfer to [facility], then to [facility] Financial Supportive of Discharge Plan: yes      Support can confirm Safety of Location and Security of Weapons: not current access to [employer] to [employer] and Monitor Medications (including Prescription and OTC): never intent to OD on meds.      Identified Barriers to Compliance with Discharge Plan: daughter said she thinks he will follow through with everything    Recommendations for Support Network:     Call [facility] if any questions    Kelli Boswell, [facility]

## 2021-09-15 NOTE — CARE COORDINATION
Spoke with Ramon Blunt from [facility] to make her aware of plan. Waiting for a bed at [facility]. Ramon Blunt asked that patient call Raisa Crump today to do the intake. There will be a bed open next week. Passed information to patient and encouraged him to call today. He said , \" I know. \"

## 2021-09-15 NOTE — FLOWSHEET NOTE
Patient has been out on unit, took a shower, and was watching tv. Patient denies depression and admits to having anxiety at times. Patient denies SI/HI/AVH. He reports good sleep and appetite.

## 2021-09-15 NOTE — PROGRESS NOTES
Pt. refused to attend the 1000 skills group, despite staff encouragement.  Electronically signed by Richard Fraga on 9/15/2021 at 1:11 PM

## 2021-09-16 LAB
HIV AG/AB: NONREACTIVE
RPR: NORMAL

## 2021-09-16 PROCEDURE — 6370000000 HC RX 637 (ALT 250 FOR IP): Performed by: PSYCHIATRY & NEUROLOGY

## 2021-09-16 PROCEDURE — 6370000000 HC RX 637 (ALT 250 FOR IP): Performed by: ANESTHESIOLOGY

## 2021-09-16 PROCEDURE — 99239 HOSP IP/OBS DSCHRG MGMT >30: CPT | Performed by: PSYCHIATRY & NEUROLOGY

## 2021-09-16 RX ADMIN — GABAPENTIN 300 MG: 300 CAPSULE ORAL at 10:12

## 2021-09-16 RX ADMIN — TRAZODONE HYDROCHLORIDE 50 MG: 50 TABLET ORAL at 01:09

## 2021-09-16 RX ADMIN — QUETIAPINE FUMARATE 25 MG: 25 TABLET ORAL at 10:12

## 2021-09-16 RX ADMIN — DIVALPROEX SODIUM 250 MG: 250 TABLET, DELAYED RELEASE ORAL at 06:15

## 2021-09-16 RX ADMIN — PANTOPRAZOLE SODIUM 40 MG: 40 TABLET, DELAYED RELEASE ORAL at 06:15

## 2021-09-16 RX ADMIN — ATOMOXETINE 25 MG: 25 CAPSULE ORAL at 10:12

## 2021-09-16 NOTE — DISCHARGE INSTR - DIET

## 2021-09-16 NOTE — PROGRESS NOTES
Patient did not attend group despite staff encouragement.   Electronically signed by Andrew Shine on 9/15/2021 at 9:26 PM

## 2021-09-16 NOTE — PROGRESS NOTES
Pt given all discharge information and instructions and able to verbalize an understanding of same. Pt signed all discharge documentation. Pt denies any suicidal or homicidal ideations or hallucinations.

## 2021-09-16 NOTE — PROGRESS NOTES
[facility]. FOLLOW-UP NOTE       9/16/2021     Patient was seen and examined in person, Chart reviewed   Patient's case discussed with staff/team    Chief Complaint: depression SI    Interim History:     Feeling better  Denies SI or HI  Less depressed  Appetite:   [x] Normal/Unchanged  [] Increased  [] Decreased      Sleep:       [] Normal/Unchanged  [x] Fair       [] Poor              Energy:    [] Normal/Unchanged  [] Increased  [x] Decreased        SI [] Present  [x] Absent    HI  []Present  [x] Absent     Aggression:  [] yes  [x] no    Patient is [x] able  [] unable to CONTRACT FOR SAFETY     PAST MEDICAL/PSYCHIATRIC HISTORY:   Past Medical History:   Diagnosis Date    Bipolar 1 disorder ([facility] [address]     Bleeding acute gastric ulcer     History of self injurious behavior        FAMILY/SOCIAL HISTORY:  History reviewed. No pertinent family history.   Social History     Socioeconomic History    Marital status: Single     Spouse name: Not on file    Number of children: Not on file    Years of education: Not on file    Highest education level: Not on file   Occupational History    Not on file   Tobacco Use    Smoking status: Current Every Day Smoker     Packs/day: 1.00     Years: 10.00     Pack years: 10.00     Types: Cigarettes    Smokeless tobacco: Former User     Types: Chew   Vaping Use    Vaping Use: Some days    Substances: Nicotine    Devices: Disposable   Substance and Sexual Activity    Alcohol use: Yes     Comment: occassional    Drug use: Yes     Types: Marijuana    Sexual activity: Not on file   Other Topics Concern    Not on file   Social History Narrative    Not on file     Social Determinants of Health     Financial Resource Strain:     Difficulty of Paying Living Expenses:    Food Insecurity:     Worried About [address] in the Last Year:     [address] [place] in the Last Year:    Transportation Needs:     Lack of Transportation (Medical):  Lack of Transportation (Non-Medical):    Physical Activity:     Days of Exercise per Week:     Minutes of Exercise per Session:    Stress:     Feeling of Stress :    Social Connections:     Frequency of Communication with Friends and Family:     Frequency of Social Gatherings with Friends and Family:     Attends Spiritism Services:     Active Member of Clubs or Organizations:     Attends Club or Organization Meetings:     Marital Status:    Intimate Partner Violence:     Fear of Current or Ex-Partner:     Emotionally Abused:     Physically Abused:     Sexually Abused:            ROS:  [x] All negative/unchanged except if checked.  Explain positive(checked items) below:  [] Constitutional  [] Eyes  [] Ear/Nose/Mouth/Throat  [] Respiratory  [] CV  [] GI  []   [] Musculoskeletal  [] Skin/Breast  [] Neurological  [] Endocrine  [] Heme/Lymph  [] Allergic/Immunologic    Explanation:     MEDICATIONS:    Current Facility-Administered Medications:     QUEtiapine (SEROQUEL XR) extended release tablet 150 mg, 150 mg, Oral, Nightly, Keyur Puga MD, 150 mg at 09/15/21 2120    atomoxetine (STRATTERA) capsule 25 mg, 25 mg, Oral, Daily, Kemar Pickard MD, 25 mg at 09/15/21 0959    nicotine (NICODERM CQ) 21 MG/24HR 1 patch, 1 patch, TransDERmal, Daily, Kemar Pickard MD, 1 patch at 09/14/21 0926    lidocaine (LMX) 4 % cream, , Topical, 4x Daily, Imani La MD, Given at 09/15/21 2121    capsaicin (ZOSTRIX) 0.025 % cream, , Topical, BID, Iamni La MD, Given at 09/15/21 2121    gabapentin (NEURONTIN) capsule 300 mg, 300 mg, Oral, TID, Imani La MD, 300 mg at 09/15/21 2120    divalproex (DEPAKOTE) DR tablet 250 mg, 250 mg, Oral, 3 times per day, Kemar Pickard MD, 250 mg at 09/16/21 0615    QUEtiapine (SEROQUEL) tablet 25 mg, 25 mg, Oral, BID, Kemar Pickard MD, 25 mg at 09/15/21 1709    pantoprazole (PROTONIX) tablet 40 mg, 40 mg, Oral, BID AC, Shelia Higgins MD, 40 mg at 09/16/21 0615    hydrOXYzine (VISTARIL) capsule 50 mg, 50 mg, Oral, Q6H PRN, 50 mg at 09/12/21 1853 **OR** hydrOXYzine (VISTARIL) injection 50 mg, 50 mg, IntraMUSCular, Q6H PRN, Joselito Weathers MD    acetaminophen (TYLENOL) tablet 650 mg, 650 mg, Oral, Q4H PRN, Joselito Weathers MD, 650 mg at 09/13/21 1542    magnesium hydroxide (MILK OF MAGNESIA) 400 MG/5ML suspension 30 mL, 30 mL, Oral, Daily PRN, Joselito Weathers MD    aluminum & magnesium hydroxide-simethicone (MAALOX) 200-200-20 MG/5ML suspension 30 mL, 30 mL, Oral, PRN, Joselito Weathers MD    haloperidol (HALDOL) tablet 5 mg, 5 mg, Oral, Q6H PRN, 5 mg at 09/12/21 2319 **OR** haloperidol lactate (HALDOL) injection 5 mg, 5 mg, IntraMUSCular, Q6H PRN, Joselito Weathers MD    benztropine mesylate (COGENTIN) injection 2 mg, 2 mg, IntraMUSCular, BID PRN, Joselito Weathers MD    traZODone (DESYREL) tablet 50 mg, 50 mg, Oral, Nightly PRN, Joselito Weathers MD, 50 mg at 09/16/21 0109      Examination:  BP 93/64   Pulse 110   Temp 98.4 °F (36.9 °C)   Resp 20   Ht 5' 7.5\" (1.715 m)   Wt 175 lb (79.4 kg)   SpO2 99%   BMI 27.00 kg/m²   Gait - steady  Medication side effects(SE): no    Mental Status Examination:    Level of consciousness:  within normal limits   Appearance:  fair grooming and fair hygiene  Behavior/Motor: less psychomotor retardation  Attitude toward examiner:  cooperative  Speech:  well articulated   Mood:less anxious and depressed  Affect:  mood congruent  Thought processes:  goal directed   Thought content:  Suicidal Ideation:  denies suicidal ideation  Cognition:  oriented to person, place, and time   Concentration intact  Insight fair   Judgement fair     ASSESSMENT:   Patient symptoms are:  [] Well controlled  [x] Improving  [] Worsening  [] No change      Diagnosis:  Principal Problem:    Bipolar 1 disorder, depressed ([facility] [address]  Active Problems:    Acute GI bleeding    Delta-9-tetrahydrocannabinol (THC) dependence ([facility] [address] Amphetamine abuse (HCC)    Idiopathic neuropathy    Peptic ulcer disease    Muscle soreness    Painful legs and moving toes    Acquired flaccid flat foot    Chronic bilateral low back pain without sciatica    Lumbago with sciatica  Resolved Problems:    * No resolved hospital problems. *      LABS:    No results for input(s): WBC, HGB, PLT in the last 72 hours. No results for input(s): NA, K, CL, CO2, BUN, CREATININE, GLUCOSE in the last 72 hours. No results for input(s): BILITOT, ALKPHOS, AST, ALT in the last 72 hours. Lab Results   Component Value Date    [address] POSITIVE 09/08/2021    BARBSCNU Neg 09/08/2021    LABBENZ Neg 09/08/2021    LABBENZ NotDTCD 01/05/2013    LABMETH Neg 09/08/2021    OPIATESCREENURINE Neg 09/08/2021    PHENCYCLIDINESCREENURINE Neg 09/08/2021    ETOH <10 09/08/2021     Lab Results   Component Value Date    TSH 0.775 09/08/2021     Lab Results   Component Value Date    LITHIUM <0.1 (L) 07/07/2015     Lab Results   Component Value Date    VALPROATE 55.8 09/21/2016       Treatment Plan:  Reviewed current Medications with the patient. Keo Vasquez accepted him for tomorrow   Risks, benefits, side effects, drug-to-drug interactions and alternatives to treatment were discussed. Collateral information:   CD evaluation  Encourage patient to attend group and other milieu activities. Discharge planning discussed with the patient and treatment team.    PSYCHOTHERAPY/COUNSELING:  [x] Therapeutic interview  [x] Supportive  [] CBT  [] Ongoing  [] Other  Patient was seen 1:1 for 20 minutes, other than E&M time spent, focusing on      - coping skills techniques     - Anxiety management techniques discussed including deep breathing exercise and PMR     - discussing patients strength and weakness      - Motivational interviewing to assess the stage of change and assessing patient readiness to quit substance use.      - Focusing on negative cognition and maladaptive thoughts, which is feeding and maintaining the depression symptoms        [x] Patient continues to need, on a daily basis, active treatment furnished directly by or requiring the supervision of inpatient psychiatric personnel      Anticipated Length of stay:            Electronically signed by Susan Gibson MD on 9/16/2021 at 9:13 AM

## 2021-09-16 NOTE — PROGRESS NOTES
Pt left unit with staff, escorted to lobby and collected by [employer] for ride to [facility]. Belonging given to pt. Denies any current SI, HI or AVH. Mood and affect stable.

## 2021-09-16 NOTE — PROGRESS NOTES
Pt. declined to attend the 0900 community meeting, despite staff encouragement.  Electronically signed by Patel Gong on 9/16/2021 at 9:40 AM

## 2021-09-17 NOTE — GROUP NOTE
Group Therapy Note    Date: 9/14/2021    Group Start Time: 3610  Group End Time: 2731  Group Topic: Cognitive Skills    [facility] [address] KATERIN Christine, [facility]        Group Therapy Note    Attendees: 5         Patient's Goal: Patient stated he plans to go to [facility]    Notes:  Patient is active in group    Status After Intervention:  Improved    Participation Level: Interactive    Participation Quality: Appropriate      Speech:  normal      Thought Process/Content: Logical      Affective Functioning: Congruent      Mood: anxious      Level of consciousness:  Alert      Response to Learning: Progressing to goal      Endings: None Reported    Modes of Intervention: Support      Discipline Responsible: /Counselor      Signature:  Regi Christine, [facility]

## 2021-09-27 LAB
ANTICARDIOLIPIN IGG ANTIBODY: <10 GPL (ref 0–14)
CARDIOLIPIN AB IGM: <10 MPL (ref 0–12)

## 2021-10-01 NOTE — DISCHARGE SUMMARY
Physician Discharge Summary     Patient ID:  Jeanette Parson  [id number]  8 y.o.  1980    Admit date: 8/26/2021    Discharge date : 8/28/21     Admitting Physician: Nani Tierney MD     Discharge Physician: Jose Cruz Cruz MD     Admission Diagnoses: Acute upper gastrointestinal bleeding [K92.2]  Acute GI bleeding [K92.2]  Symptomatic anemia [D64.9]    Discharge Diagnoses: Acute upper gi bleed, acute blood loss anemia, alcoholic hepatitis      Admission Condition: fair    Discharged Condition: good    Hospital Course:      1 - Acute upper gi bleed with acute blood loss anemia              Underwent emergent EGD, ulcer clipped and cauterized. Transfusing 2 units, await repeat H/H, PPI gtt, likely repeat EGD in 2 days per GI. ICU admission              8/27 - h/h improved, but slowly dropping, monitor. Plan repeat egd in 1-2 days per GI    8/28 - change to outpt egd in 2-3 days, d/w gi and patient    # - Hepatitis              Presumed alcoholic, recheck lfts in am, check hepatitis panel              8/27 - hep c on panel. Outpt f/u     2 - Asthma     3 - Hx opioid dependence       Consults: GI    Significant Diagnostic Studies: as below    Discharge Exam:  /78   Pulse 86   Temp 97.7 °F (36.5 °C) (Axillary)   Resp 18   Ht 5' 8\" (1.727 m)   Wt 170 lb (77.1 kg)   SpO2 98%   BMI 25.85 kg/m²   General appearance: alert, appears stated age and cooperative  Lungs: clear to auscultation bilaterally  Heart: regular rate and rhythm, S1, S2 normal, no murmur, click, rub or gallop  Abdomen: soft, non-tender; bowel sounds normal; no masses,  no organomegaly  Extremities: extremities normal, atraumatic, no cyanosis or edema  Skin: Skin color, texture, turgor normal. No rashes or lesions    Labs:   No results for input(s): WBC, HGB, HCT, PLT in the last 72 hours. No results for input(s): NA, K, CL, CO2, BUN, CREATININE, CALCIUM, PHOS in the last 72 hours.     Invalid input(s): MAGNES  No results for input(s): AST, ALT, BILIDIR, BILITOT, ALKPHOS in the last 72 hours. No results for input(s): INR in the last 72 hours. No results for input(s): Sabino Beery in the last 72 hours. Urinalysis:   Lab Results   Component Value Date    NITRU Negative 09/08/2021    WBCUA 0-2 08/10/2016    BACTERIA Few 08/08/2015    RBCUA 3-5 08/10/2016    BLOODU Negative 09/08/2021    SPECGRAV 1.013 09/08/2021    GLUCOSEU Negative 09/08/2021    GLUCOSEU NEG 12/30/2011       Radiology:   Most recent    Chest CT      WITH CONTRAST:No results found for this or any previous visit. WITHOUT CONTRAST: No results found for this or any previous visit. CXR      2-view: No results found for this or any previous visit. Portable: Results for orders placed during the hospital encounter of 08/26/21    XR CHEST PORTABLE    Narrative  EXAMINATION: XR CHEST PORTABLE    CLINICAL HISTORY: SWEATING. FEVERISH. VOMITING. COMPARISONS: None available. FINDINGS: Osseous structures intact. Cardiopericardial silhouette normal. Pulmonary vasculature normal. Lungs clear. Impression  NO ACUTE CARDIOPULMONARY DISEASE. Echo No results found for this or any previous visit. Disposition: home    In process/preliminary results:  Outstanding Order Results       No orders found from 7/28/2021 to 8/27/2021.             Patient Instructions:   Discharge Medication List as of 8/28/2021  5:57 PM        START taking these medications    Details   pantoprazole (PROTONIX) 40 MG tablet Take 1 tablet by mouth 2 times daily (before meals), Disp-60 tablet, R-2Normal           STOP taking these medications       ibuprofen (ADVIL;MOTRIN) 600 MG tablet Comments:   Reason for Stopping:         buPROPion (WELLBUTRIN) 100 MG tablet Comments:   Reason for Stopping:         traZODone (DESYREL) 100 MG tablet Comments:   Reason for Stopping:         divalproex (DEPAKOTE SPRINKLE) 125 MG capsule Comments:   Reason for Stopping:         gabapentin (NEURONTIN) 100 MG capsule Comments:   Reason for Stopping:             Activity: activity as tolerated  Diet: regular diet  Wound Care: none needed    Follow-up with PCP 1-2 weeks.     DC time 35 minutes    Signed:  Electronically signed by Alanis Sheehan MD on 10/1/2021 at 12:46 AM

## 2023-11-13 NOTE — ED TRIAGE NOTES
Pt is in with concern of self harm. Pt states he is really anxious and needs to talk to someone. week(s)

## (undated) DEVICE — TUBING, SUCTION, 1/4" X 10', STRAIGHT: Brand: MEDLINE

## (undated) DEVICE — GLOVE ORANGE PI 8 1/2   MSG9085

## (undated) DEVICE — TUBE SET 96 MM 64 MM H2O PERISTALTIC STD AUX CHANNEL

## (undated) DEVICE — ENDO CARRY-ON PROCEDURE KIT: Brand: ENDO CARRY-ON PROCEDURE KIT

## (undated) DEVICE — BRUSH ENDO CLN L90.5IN SHTH DIA1.7MM BRIST DIA5-7MM 2-6MM

## (undated) DEVICE — CONMED SCOPE SAVER BITE BLOCK, 20X27 MM: Brand: SCOPE SAVER

## (undated) DEVICE — CATHETER SCLERO L240CM NDL 25GA L4MM SHTH DIA2.3MM CNTRST

## (undated) DEVICE — Device: Brand: ENDO SMARTCAP

## (undated) DEVICE — INSTINCT ENDOSCOPIC HEMOCLIP: Brand: INSTINCT

## (undated) DEVICE — ADAPTER FLSH PMP FLD MGMT GI IRRIG OFP 2 DISPOSABLE

## (undated) DEVICE — SINGLE PORT MANIFOLD: Brand: NEPTUNE 2

## (undated) DEVICE — REPLAY HEMOSTASIS CLIP, 16MM SPAN: Brand: REPLAY